# Patient Record
Sex: FEMALE | Race: BLACK OR AFRICAN AMERICAN | URBAN - METROPOLITAN AREA
[De-identification: names, ages, dates, MRNs, and addresses within clinical notes are randomized per-mention and may not be internally consistent; named-entity substitution may affect disease eponyms.]

---

## 2019-02-23 ENCOUNTER — APPOINTMENT (OUTPATIENT)
Dept: CT IMAGING | Age: 59
End: 2019-02-23
Payer: MEDICAID

## 2019-02-23 ENCOUNTER — HOSPITAL ENCOUNTER (EMERGENCY)
Age: 59
Discharge: HOME | End: 2019-02-23
Attending: EMERGENCY MEDICINE
Payer: MEDICAID

## 2019-02-23 VITALS
DIASTOLIC BLOOD PRESSURE: 74 MMHG | WEIGHT: 194 LBS | HEART RATE: 64 BPM | TEMPERATURE: 97.5 F | OXYGEN SATURATION: 94 % | RESPIRATION RATE: 16 BRPM | SYSTOLIC BLOOD PRESSURE: 125 MMHG

## 2019-02-23 DIAGNOSIS — R51.9 NONINTRACTABLE HEADACHE, UNSPECIFIED CHRONICITY PATTERN, UNSPECIFIED HEADACHE TYPE: ICD-10-CM

## 2019-02-23 DIAGNOSIS — R10.84 GENERALIZED ABDOMINAL PAIN: Primary | ICD-10-CM

## 2019-02-23 LAB
A/G RATIO: 1.2 (ref 1.1–2.2)
ALBUMIN SERPL-MCNC: 4.3 G/DL (ref 3.4–5)
ALP BLD-CCNC: 97 U/L (ref 40–129)
ALT SERPL-CCNC: 16 U/L (ref 10–40)
ANION GAP SERPL CALCULATED.3IONS-SCNC: 15 MMOL/L (ref 3–16)
AST SERPL-CCNC: 19 U/L (ref 15–37)
BASOPHILS ABSOLUTE: 0.1 K/UL (ref 0–0.2)
BASOPHILS RELATIVE PERCENT: 0.7 %
BILIRUB SERPL-MCNC: <0.2 MG/DL (ref 0–1)
BILIRUBIN URINE: NEGATIVE
BLOOD, URINE: NEGATIVE
BUN BLDV-MCNC: 16 MG/DL (ref 7–20)
CALCIUM SERPL-MCNC: 9.7 MG/DL (ref 8.3–10.6)
CHLORIDE BLD-SCNC: 100 MMOL/L (ref 99–110)
CLARITY: CLEAR
CO2: 25 MMOL/L (ref 21–32)
COLOR: YELLOW
CREAT SERPL-MCNC: 0.7 MG/DL (ref 0.6–1.1)
EOSINOPHILS ABSOLUTE: 0.1 K/UL (ref 0–0.6)
EOSINOPHILS RELATIVE PERCENT: 1.5 %
GFR AFRICAN AMERICAN: >60
GFR NON-AFRICAN AMERICAN: >60
GLOBULIN: 3.5 G/DL
GLUCOSE BLD-MCNC: 98 MG/DL (ref 70–99)
GLUCOSE URINE: NEGATIVE MG/DL
HCT VFR BLD CALC: 39 % (ref 36–48)
HEMOGLOBIN: 12.9 G/DL (ref 12–16)
KETONES, URINE: NEGATIVE MG/DL
LEUKOCYTE ESTERASE, URINE: NEGATIVE
LIPASE: 42 U/L (ref 13–60)
LYMPHOCYTES ABSOLUTE: 3.7 K/UL (ref 1–5.1)
LYMPHOCYTES RELATIVE PERCENT: 52.8 %
MCH RBC QN AUTO: 30.9 PG (ref 26–34)
MCHC RBC AUTO-ENTMCNC: 33.1 G/DL (ref 31–36)
MCV RBC AUTO: 93.6 FL (ref 80–100)
MICROSCOPIC EXAMINATION: NORMAL
MONOCYTES ABSOLUTE: 0.7 K/UL (ref 0–1.3)
MONOCYTES RELATIVE PERCENT: 9.8 %
NEUTROPHILS ABSOLUTE: 2.5 K/UL (ref 1.7–7.7)
NEUTROPHILS RELATIVE PERCENT: 35.2 %
NITRITE, URINE: NEGATIVE
PDW BLD-RTO: 13.3 % (ref 12.4–15.4)
PH UA: 6
PLATELET # BLD: 349 K/UL (ref 135–450)
PMV BLD AUTO: 7.8 FL (ref 5–10.5)
POTASSIUM REFLEX MAGNESIUM: 3.6 MMOL/L (ref 3.5–5.1)
PROTEIN UA: NEGATIVE MG/DL
RBC # BLD: 4.17 M/UL (ref 4–5.2)
SODIUM BLD-SCNC: 140 MMOL/L (ref 136–145)
SPECIFIC GRAVITY UA: 1.01
TOTAL PROTEIN: 7.8 G/DL (ref 6.4–8.2)
URINE REFLEX TO CULTURE: NORMAL
URINE TYPE: NORMAL
UROBILINOGEN, URINE: 0.2 E.U./DL
WBC # BLD: 7.1 K/UL (ref 4–11)

## 2019-02-23 PROCEDURE — 96361 HYDRATE IV INFUSION ADD-ON: CPT

## 2019-02-23 PROCEDURE — 80053 COMPREHEN METABOLIC PANEL: CPT

## 2019-02-23 PROCEDURE — 83690 ASSAY OF LIPASE: CPT

## 2019-02-23 PROCEDURE — 96375 TX/PRO/DX INJ NEW DRUG ADDON: CPT

## 2019-02-23 PROCEDURE — 6360000004 HC RX CONTRAST MEDICATION: Performed by: EMERGENCY MEDICINE

## 2019-02-23 PROCEDURE — 96374 THER/PROPH/DIAG INJ IV PUSH: CPT

## 2019-02-23 PROCEDURE — 70450 CT HEAD/BRAIN W/O DYE: CPT

## 2019-02-23 PROCEDURE — 6360000002 HC RX W HCPCS: Performed by: EMERGENCY MEDICINE

## 2019-02-23 PROCEDURE — 99284 EMERGENCY DEPT VISIT MOD MDM: CPT

## 2019-02-23 PROCEDURE — 74177 CT ABD & PELVIS W/CONTRAST: CPT

## 2019-02-23 PROCEDURE — 85025 COMPLETE CBC W/AUTO DIFF WBC: CPT

## 2019-02-23 PROCEDURE — 81003 URINALYSIS AUTO W/O SCOPE: CPT

## 2019-02-23 PROCEDURE — 2580000003 HC RX 258: Performed by: EMERGENCY MEDICINE

## 2019-02-23 RX ORDER — DICYCLOMINE HYDROCHLORIDE 10 MG/1
10 CAPSULE ORAL
Qty: 30 CAPSULE | Refills: 0 | Status: SHIPPED | OUTPATIENT
Start: 2019-02-23 | End: 2019-08-28 | Stop reason: CLARIF

## 2019-02-23 RX ORDER — IBUPROFEN 600 MG/1
600 TABLET ORAL EVERY 6 HOURS PRN
Qty: 30 TABLET | Refills: 0 | Status: SHIPPED | OUTPATIENT
Start: 2019-02-23 | End: 2019-08-28 | Stop reason: CLARIF

## 2019-02-23 RX ORDER — KETOROLAC TROMETHAMINE 30 MG/ML
30 INJECTION, SOLUTION INTRAMUSCULAR; INTRAVENOUS ONCE
Status: COMPLETED | OUTPATIENT
Start: 2019-02-23 | End: 2019-02-23

## 2019-02-23 RX ORDER — ONDANSETRON 4 MG/1
4-8 TABLET, ORALLY DISINTEGRATING ORAL EVERY 12 HOURS PRN
Qty: 12 TABLET | Refills: 0 | Status: SHIPPED | OUTPATIENT
Start: 2019-02-23 | End: 2019-08-28 | Stop reason: CLARIF

## 2019-02-23 RX ORDER — DIPHENHYDRAMINE HYDROCHLORIDE 50 MG/ML
25 INJECTION INTRAMUSCULAR; INTRAVENOUS ONCE
Status: COMPLETED | OUTPATIENT
Start: 2019-02-23 | End: 2019-02-23

## 2019-02-23 RX ORDER — 0.9 % SODIUM CHLORIDE 0.9 %
1000 INTRAVENOUS SOLUTION INTRAVENOUS ONCE
Status: COMPLETED | OUTPATIENT
Start: 2019-02-23 | End: 2019-02-23

## 2019-02-23 RX ADMIN — DIPHENHYDRAMINE HYDROCHLORIDE 25 MG: 50 INJECTION INTRAMUSCULAR; INTRAVENOUS at 20:10

## 2019-02-23 RX ADMIN — SODIUM CHLORIDE 1000 ML: 9 INJECTION, SOLUTION INTRAVENOUS at 20:10

## 2019-02-23 RX ADMIN — KETOROLAC TROMETHAMINE 30 MG: 30 INJECTION, SOLUTION INTRAMUSCULAR at 20:10

## 2019-02-23 RX ADMIN — PROCHLORPERAZINE EDISYLATE 10 MG: 5 INJECTION INTRAMUSCULAR; INTRAVENOUS at 20:10

## 2019-02-23 RX ADMIN — IOPAMIDOL 75 ML: 755 INJECTION, SOLUTION INTRAVENOUS at 20:52

## 2019-02-23 ASSESSMENT — PAIN DESCRIPTION - PAIN TYPE
TYPE: ACUTE PAIN
TYPE: ACUTE PAIN

## 2019-02-23 ASSESSMENT — PAIN SCALES - GENERAL
PAINLEVEL_OUTOF10: 4
PAINLEVEL_OUTOF10: 8
PAINLEVEL_OUTOF10: 8

## 2019-02-23 ASSESSMENT — PAIN DESCRIPTION - LOCATION
LOCATION: HEAD
LOCATION: HEAD

## 2019-02-23 ASSESSMENT — PAIN DESCRIPTION - FREQUENCY: FREQUENCY: CONTINUOUS

## 2019-02-23 ASSESSMENT — PAIN DESCRIPTION - PROGRESSION: CLINICAL_PROGRESSION: GRADUALLY IMPROVING

## 2019-02-24 ASSESSMENT — ENCOUNTER SYMPTOMS
BACK PAIN: 0
SHORTNESS OF BREATH: 0
VOMITING: 0
DIARRHEA: 0
NAUSEA: 1
ABDOMINAL PAIN: 1
BLOOD IN STOOL: 0
PHOTOPHOBIA: 0

## 2019-08-28 ENCOUNTER — TELEPHONE (OUTPATIENT)
Dept: FAMILY MEDICINE CLINIC | Age: 59
End: 2019-08-28

## 2019-09-12 ENCOUNTER — OFFICE VISIT (OUTPATIENT)
Dept: FAMILY MEDICINE CLINIC | Age: 59
End: 2019-09-12

## 2019-09-12 VITALS
OXYGEN SATURATION: 98 % | RESPIRATION RATE: 16 BRPM | DIASTOLIC BLOOD PRESSURE: 82 MMHG | TEMPERATURE: 98.6 F | SYSTOLIC BLOOD PRESSURE: 116 MMHG | WEIGHT: 200.4 LBS | HEART RATE: 80 BPM | HEIGHT: 67 IN | BODY MASS INDEX: 31.45 KG/M2

## 2019-09-12 DIAGNOSIS — I10 ESSENTIAL HYPERTENSION: ICD-10-CM

## 2019-09-12 DIAGNOSIS — Z12.11 COLON CANCER SCREENING: ICD-10-CM

## 2019-09-12 DIAGNOSIS — Z12.39 BREAST CANCER SCREENING: ICD-10-CM

## 2019-09-12 DIAGNOSIS — Z12.4 CERVICAL CANCER SCREENING: ICD-10-CM

## 2019-09-12 DIAGNOSIS — M25.512 ACUTE PAIN OF LEFT SHOULDER: Primary | ICD-10-CM

## 2019-09-12 DIAGNOSIS — Z13.220 LIPID SCREENING: ICD-10-CM

## 2019-09-12 PROCEDURE — 99204 OFFICE O/P NEW MOD 45 MIN: CPT | Performed by: FAMILY MEDICINE

## 2019-09-12 RX ORDER — AMLODIPINE BESYLATE 5 MG/1
5 TABLET ORAL DAILY
Qty: 30 TABLET | Refills: 2 | Status: SHIPPED | OUTPATIENT
Start: 2019-09-12 | End: 2022-02-09

## 2019-09-12 RX ORDER — NAPROXEN 500 MG/1
500 TABLET ORAL 2 TIMES DAILY WITH MEALS
Qty: 40 TABLET | Refills: 0 | Status: SHIPPED | OUTPATIENT
Start: 2019-09-12 | End: 2022-02-09

## 2019-09-12 RX ORDER — HYDROCHLOROTHIAZIDE 25 MG/1
25 TABLET ORAL DAILY
Qty: 30 TABLET | Refills: 2 | Status: SHIPPED | OUTPATIENT
Start: 2019-09-12 | End: 2022-02-09

## 2019-09-12 ASSESSMENT — ENCOUNTER SYMPTOMS
EYE PAIN: 0
CONSTIPATION: 0
DIARRHEA: 0
RECTAL PAIN: 0
COLOR CHANGE: 0
EYE ITCHING: 0
ANAL BLEEDING: 0
SORE THROAT: 0
BLOOD IN STOOL: 0
CHOKING: 0
NAUSEA: 0
ABDOMINAL DISTENTION: 0
SINUS PRESSURE: 0
EYE REDNESS: 0
BACK PAIN: 0
PHOTOPHOBIA: 0
STRIDOR: 0
APNEA: 0
SHORTNESS OF BREATH: 0
EYE DISCHARGE: 0
COUGH: 0
RHINORRHEA: 0
VOMITING: 0
TROUBLE SWALLOWING: 0
WHEEZING: 0
ABDOMINAL PAIN: 0
SINUS PAIN: 0
VOICE CHANGE: 0
CHEST TIGHTNESS: 0
FACIAL SWELLING: 0

## 2019-09-12 ASSESSMENT — PATIENT HEALTH QUESTIONNAIRE - PHQ9
2. FEELING DOWN, DEPRESSED OR HOPELESS: 0
SUM OF ALL RESPONSES TO PHQ QUESTIONS 1-9: 0
1. LITTLE INTEREST OR PLEASURE IN DOING THINGS: 0
SUM OF ALL RESPONSES TO PHQ9 QUESTIONS 1 & 2: 0
SUM OF ALL RESPONSES TO PHQ QUESTIONS 1-9: 0

## 2019-09-12 NOTE — PROGRESS NOTES
reviewed. Pt' wishes to continue to proceed w/meds. hydrochlorothiazide (HYDRODIURIL) 25 MG tablet    amLODIPine (NORVASC) 5 MG tablet   3. Lipid screening  Comprehensive Metabolic Panel    Lipid Panel   4. Breast cancer screening  SONIDO DIGITAL SCREEN W OR WO CAD BILATERAL   5. Colon cancer screening  Ambulatory referral to Gastroenterology   6. Cervical cancer screening  Pt' to establish with gyn for routine screening exams. Plan:      Advised release of medical records from all prior physicians(including PCP/specialists)  Call or return to clinic prn if these symptoms worsen or fail to improve as anticipated. Pt' left office in good condition. Obtain labs/diagnostic tests as discussed today & call back for results within 2-7days.           Irvin Morrow MD

## 2021-01-19 ENCOUNTER — TELEPHONE (OUTPATIENT)
Dept: FAMILY MEDICINE CLINIC | Age: 61
End: 2021-01-19

## 2021-01-19 NOTE — TELEPHONE ENCOUNTER
Patient would like to schedule an appt for pain in leg and back. OV: 9/12/19  [phone]    Please advise.

## 2021-06-04 ENCOUNTER — HOSPITAL ENCOUNTER (EMERGENCY)
Age: 61
Discharge: HOME | End: 2021-06-04
Payer: COMMERCIAL

## 2021-06-04 ENCOUNTER — APPOINTMENT (OUTPATIENT)
Dept: GENERAL RADIOLOGY | Age: 61
End: 2021-06-04
Payer: COMMERCIAL

## 2021-06-04 VITALS
HEART RATE: 67 BPM | SYSTOLIC BLOOD PRESSURE: 145 MMHG | RESPIRATION RATE: 18 BRPM | BODY MASS INDEX: 30.85 KG/M2 | OXYGEN SATURATION: 96 % | DIASTOLIC BLOOD PRESSURE: 86 MMHG | WEIGHT: 197 LBS | TEMPERATURE: 98.2 F

## 2021-06-04 DIAGNOSIS — M54.50 ACUTE LEFT-SIDED LOW BACK PAIN WITHOUT SCIATICA: Primary | ICD-10-CM

## 2021-06-04 PROCEDURE — 99283 EMERGENCY DEPT VISIT LOW MDM: CPT

## 2021-06-04 PROCEDURE — 6370000000 HC RX 637 (ALT 250 FOR IP): Performed by: PHYSICIAN ASSISTANT

## 2021-06-04 PROCEDURE — 72100 X-RAY EXAM L-S SPINE 2/3 VWS: CPT

## 2021-06-04 PROCEDURE — 6360000002 HC RX W HCPCS: Performed by: PHYSICIAN ASSISTANT

## 2021-06-04 PROCEDURE — 96372 THER/PROPH/DIAG INJ SC/IM: CPT

## 2021-06-04 RX ORDER — LIDOCAINE 4 G/G
1 PATCH TOPICAL ONCE
Status: DISCONTINUED | OUTPATIENT
Start: 2021-06-04 | End: 2021-06-04 | Stop reason: HOSPADM

## 2021-06-04 RX ORDER — NAPROXEN 500 MG/1
500 TABLET ORAL 2 TIMES DAILY
Qty: 20 TABLET | Refills: 0 | Status: SHIPPED | OUTPATIENT
Start: 2021-06-04 | End: 2022-02-09

## 2021-06-04 RX ORDER — CYCLOBENZAPRINE HCL 10 MG
10 TABLET ORAL 3 TIMES DAILY PRN
Qty: 20 TABLET | Refills: 0 | Status: SHIPPED | OUTPATIENT
Start: 2021-06-04 | End: 2021-06-14

## 2021-06-04 RX ORDER — ORPHENADRINE CITRATE 30 MG/ML
60 INJECTION INTRAMUSCULAR; INTRAVENOUS ONCE
Status: COMPLETED | OUTPATIENT
Start: 2021-06-04 | End: 2021-06-04

## 2021-06-04 RX ORDER — KETOROLAC TROMETHAMINE 30 MG/ML
30 INJECTION, SOLUTION INTRAMUSCULAR; INTRAVENOUS ONCE
Status: COMPLETED | OUTPATIENT
Start: 2021-06-04 | End: 2021-06-04

## 2021-06-04 RX ORDER — LIDOCAINE 50 MG/G
1 PATCH TOPICAL DAILY
Qty: 30 PATCH | Refills: 0 | Status: SHIPPED | OUTPATIENT
Start: 2021-06-04 | End: 2022-02-09

## 2021-06-04 RX ADMIN — KETOROLAC TROMETHAMINE 30 MG: 30 INJECTION, SOLUTION INTRAMUSCULAR at 10:33

## 2021-06-04 RX ADMIN — ORPHENADRINE CITRATE 60 MG: 60 INJECTION INTRAMUSCULAR; INTRAVENOUS at 10:33

## 2021-06-04 ASSESSMENT — PAIN SCALES - GENERAL
PAINLEVEL_OUTOF10: 9
PAINLEVEL_OUTOF10: 9

## 2021-06-04 NOTE — ED PROVIDER NOTES
[facility]        Pt Name: Erik Severino  MRN: [id number]  [place] 1960  Date of evaluation: 6/4/2021  Provider: Gregg Painting PA-C  PCP: Laura Kowalski MD  Note Started: 10:40 AM EDT       OLIVIA. I have evaluated this patient. My supervising physician was available for consultation. CHIEF COMPLAINT       Chief Complaint   Patient presents with    Back Pain     Left sided lower back pain for three months after fall sustained in Feb.         HISTORY OF PRESENT ILLNESS   (Location, Timing/Onset, Context/Setting, Quality, Duration, Modifying Factors, Severity, Associated Signs and Symptoms)  Note limiting factors. Erik Severino is a 64 y.o. female who presents with a Chief Complaint of left low back pain. Language line [id number] was used to obtain history. Patient states that she fell back in February when she slipped on ice on the steps and fell backwards. She states she however did not have pain constantly until about 1 week ago. She was initially seen in urgent care on 5/22 and was given a Medrol Dosepak and ibuprofen and states that the pain got worse last night. 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 She denies nausea, vomiting, fevers, dysuria, hematuria, diarrhea, bloody stool or any urinary or bowel symptoms. Denies a history of kidney failure or diabetes. States that she was referred here by her primary care physician due to Covid. Denies any other symptoms. Rates the pain a 9 out of 10. This is worse with movement. Nursing Notes were all reviewed and agreed with or any disagreements were addressed in the HPI. REVIEW OF SYSTEMS    (2-9 systems for level 4, 10 or more for level 5)     Review of Systems    Positives and Pertinent negatives as per HPI. Except as noted above in the ROS, all other systems were reviewed and negative.        PAST MEDICAL HISTORY     Past Medical History:   Diagnosis Date    Cervical cancer screening 2016    Nml per pt'.  History of mammogram 07/2018    Nml per pt'.  HTN (hypertension)          SURGICAL HISTORY   History reviewed. No pertinent surgical history. [address]       Discharge Medication List as of 6/4/2021 11:13 AM      CONTINUE these medications which have NOT CHANGED    Details   hydrochlorothiazide (HYDRODIURIL) 25 MG tablet Take 1 tablet by mouth daily, Disp-30 tablet, R-2Normal      amLODIPine (NORVASC) 5 MG tablet Take 1 tablet by mouth daily, Disp-30 tablet, R-2Normal      !! naproxen (NAPROSYN) 500 MG tablet Take 1 tablet by mouth 2 times daily (with meals), Disp-40 tablet, R-0Normal       !! - Potential duplicate medications found. Please discuss with provider. ALLERGIES     Patient has no known allergies. FAMILYHISTORY     History reviewed. No pertinent family history. SOCIAL HISTORY       Social History     Tobacco Use    Smoking status: Never Smoker    Smokeless tobacco: Never Used   Vaping Use    Vaping Use: Unknown   Substance Use Topics    Alcohol use: No    Drug use: Never       SCREENINGS             PHYSICAL EXAM    (up to 7 for level 4, 8 or more for level 5)     ED Triage Vitals [06/04/21 1002]   BP Temp Temp Source Pulse Resp SpO2 Height Weight   (!) 145/86 98.2 °F (36.8 °C) Oral 67 18 96 % -- 197 lb (89.4 kg)       Physical Exam  Vitals and nursing note reviewed. Constitutional:       Appearance: She is well-developed. She is not diaphoretic. HENT:      Head: Atraumatic. Nose: Nose normal.   Eyes:      General:         Right eye: No discharge. Left eye: No discharge. Cardiovascular:      Rate and Rhythm: Normal rate and regular rhythm. Heart sounds: No murmur heard. No friction rub. No gallop. Pulmonary:      Effort: Pulmonary effort is normal. No respiratory distress. Breath sounds: No stridor.  No wheezing, rhonchi or rales.   Abdominal:      General: Bowel sounds are normal. There is no distension. Palpations: Abdomen is soft. There is no mass. Tenderness: There is no abdominal tenderness. There is no guarding or rebound. Hernia: No hernia is present. Musculoskeletal:         General: No swelling. Normal range of motion. Cervical back: Normal range of motion. Comments: Tenderness to palpate over the left paralumbar musculature. No midline tenderness or step-off in the neck or back. Mild decreased range of motion with flexion of left hip secondary to pain. Patient able to ambulate. Full range of motion of right hip with flexion extension of bilateral knees, ankles. Sensation light touch in bilateral lower extremities intact. 2+ patellar and Achilles tendon reflexes intact. Skin:     General: Skin is warm and dry. Findings: No erythema or rash. Neurological:      Mental Status: She is alert and oriented to person, place, and time. Cranial Nerves: No cranial nerve deficit. Psychiatric:         Behavior: Behavior normal.         DIAGNOSTIC RESULTS   LABS:    Labs Reviewed - No data to display    All other labs were within normal range or not returned as of this dictation. EKG: All EKG's are interpreted by the Emergency Department Physician in the absence of a cardiologist.  Please see their note for interpretation of EKG. RADIOLOGY:   Non-plain film images such as CT, Ultrasound and MRI are read by the radiologist. Plain radiographic images are visualized and preliminarily interpreted by the ED Provider with the below findings:        Interpretation per the Radiologist below, if available at the time of this note:    XR LUMBAR SPINE (2-3 VIEWS)   Final Result   Mild multilevel degenerative changes. No acute osseous abnormality.       Rounded calcification to the right of midline is of undetermined   location/etiology and is likely of no clinical significance in the absence of   signs patch, R-0Print      cyclobenzaprine (FLEXERIL) 10 MG tablet Take 1 tablet by mouth 3 times daily as needed for Muscle spasms, Disp-20 tablet, R-0Print       !! - Potential duplicate medications found. Please discuss with provider.           DISCONTINUED MEDICATIONS:  Discharge Medication List as of 6/4/2021 11:13 AM                 (Please note that portions of this note were completed with a voice recognition program.  Efforts were made to edit the dictations but occasionally words are mis-transcribed.)    Melvia Baumgarten, PA-C (electronically signed)            Melvia Baumgarten, PA-C  06/04/21 0491

## 2021-06-04 NOTE — ED NOTES
Using  #[id number]- d/c instructions given. Pt ambulated independently.       Yan Soni RN  06/04/21 3981

## 2021-06-10 ENCOUNTER — OFFICE VISIT (OUTPATIENT)
Dept: INTERNAL MEDICINE CLINIC | Age: 61
End: 2021-06-10
Payer: COMMERCIAL

## 2021-06-10 VITALS
SYSTOLIC BLOOD PRESSURE: 118 MMHG | BODY MASS INDEX: 30.61 KG/M2 | HEIGHT: 67 IN | HEART RATE: 86 BPM | DIASTOLIC BLOOD PRESSURE: 66 MMHG | WEIGHT: 195 LBS | OXYGEN SATURATION: 97 %

## 2021-06-10 DIAGNOSIS — M54.32 SCIATICA OF LEFT SIDE: Primary | ICD-10-CM

## 2021-06-10 PROCEDURE — 1111F DSCHRG MED/CURRENT MED MERGE: CPT | Performed by: NURSE PRACTITIONER

## 2021-06-10 PROCEDURE — 99204 OFFICE O/P NEW MOD 45 MIN: CPT | Performed by: NURSE PRACTITIONER

## 2021-06-10 RX ORDER — PREDNISONE 20 MG/1
40 TABLET ORAL DAILY
Qty: 20 TABLET | Refills: 0 | Status: SHIPPED | OUTPATIENT
Start: 2021-06-10 | End: 2021-06-20

## 2021-06-10 SDOH — ECONOMIC STABILITY: FOOD INSECURITY: WITHIN THE PAST 12 MONTHS, YOU WORRIED THAT YOUR FOOD WOULD RUN OUT BEFORE YOU GOT MONEY TO BUY MORE.: NEVER TRUE

## 2021-06-10 SDOH — ECONOMIC STABILITY: FOOD INSECURITY: WITHIN THE PAST 12 MONTHS, THE FOOD YOU BOUGHT JUST DIDN'T LAST AND YOU DIDN'T HAVE MONEY TO GET MORE.: NEVER TRUE

## 2021-06-10 ASSESSMENT — PATIENT HEALTH QUESTIONNAIRE - PHQ9
SUM OF ALL RESPONSES TO PHQ QUESTIONS 1-9: 0
1. LITTLE INTEREST OR PLEASURE IN DOING THINGS: 0
SUM OF ALL RESPONSES TO PHQ QUESTIONS 1-9: 0
2. FEELING DOWN, DEPRESSED OR HOPELESS: 0
SUM OF ALL RESPONSES TO PHQ QUESTIONS 1-9: 0
SUM OF ALL RESPONSES TO PHQ9 QUESTIONS 1 & 2: 0

## 2021-06-10 ASSESSMENT — SOCIAL DETERMINANTS OF HEALTH (SDOH): HOW HARD IS IT FOR YOU TO PAY FOR THE VERY BASICS LIKE FOOD, HOUSING, MEDICAL CARE, AND HEATING?: NOT HARD AT ALL

## 2021-06-10 NOTE — PROGRESS NOTES
tablet Take 1 tablet by mouth 2 times daily for 20 doses 20 tablet 0    lidocaine (LIDODERM) 5 % Place 1 patch onto the skin daily 12 hours on, 12 hours off. 30 patch 0    cyclobenzaprine (FLEXERIL) 10 MG tablet Take 1 tablet by mouth 3 times daily as needed for Muscle spasms 20 tablet 0    hydrochlorothiazide (HYDRODIURIL) 25 MG tablet Take 1 tablet by mouth daily 30 tablet 2    amLODIPine (NORVASC) 5 MG tablet Take 1 tablet by mouth daily 30 tablet 2    naproxen (NAPROSYN) 500 MG tablet Take 1 tablet by mouth 2 times daily (with meals) 40 tablet 0        Medications patient taking as of now reconciled against medications ordered at time of hospital discharge: Yes    Chief Complaint   Patient presents with    Follow-Up from Hospital       History of Present illness - Follow up of Hospital diagnosis(es): back pain    Inpatient course: Discharge summary reviewed- see chart. Interval history/Current status: Patient in the office today to follow-up from hospital visit to the emergency room. She hurt her back back in February when she fell and has a recurrence of pain. She states that the pain is in the lower left side of her back radiating to her left buttock and down her leg. She was given a muscle relaxer, naproxen, and Lidoderm patches. She states that the pain is not improved with these medications. She states that the pain is worse when in a position but when she gets up and starts moving the pain is improved. She has been trying stretches and exercises as recommended by the emergency room. She has no new or worsening symptoms otherwise. A comprehensive review of systems was negative except for what was noted in the HPI. Vitals:    06/10/21 1100   BP: 118/66   Site: Left Upper Arm   Position: Sitting   Cuff Size: Large Adult   Pulse: 86   SpO2: 97%   Weight: 195 lb (88.5 kg)   Height: 5' 7\" (1.702 m)     Body mass index is 30.54 kg/m².    Wt Readings from Last 3 Encounters:   06/10/21 195 lb (88.5 kg)   06/04/21 197 lb (89.4 kg)   09/12/19 200 lb 6.4 oz (90.9 kg)     BP Readings from Last 3 Encounters:   06/10/21 118/66   06/04/21 (!) 145/86   09/12/19 116/82        Physical Exam:  General Appearance: alert and oriented to person, place and time, well developed and well- nourished, in no acute distress  Skin: warm and dry, no rash or erythema  Head: normocephalic and atraumatic  Eyes: pupils equal, round, and reactive to light, extraocular eye movements intact, conjunctivae normal  ENT: tympanic membrane, external ear and ear canal normal bilaterally, nose without deformity, nasal mucosa and turbinates normal without polyps  Neck: supple and non-tender without mass, no thyromegaly or thyroid nodules, no cervical lymphadenopathy  Pulmonary/Chest: clear to auscultation bilaterally- no wheezes, rales or rhonchi, normal air movement, no respiratory distress  Cardiovascular: normal rate, regular rhythm, normal S1 and S2, no murmurs, rubs, clicks, or gallops, distal pulses intact, no carotid bruits  Abdomen: soft, non-tender, non-distended, normal bowel sounds, no masses or organomegaly  Extremities: no cyanosis, clubbing or edema  Musculoskeletal: normal range of motion, no joint swelling, deformity or tenderness  Neurologic: reflexes normal and symmetric, no cranial nerve deficit, gait, coordination and speech normal    Assessment/Plan:      Sciatica of left side   ER visit reviewed. Symptoms today still consistent with sciatic back pain. We will send in prescription of prednisone and reiterate stretches and exercises to be completed. Advised that this is a recurring issue may flare from time to time. Back strengthening exercises and proper ergonomics can prevent future flareups. She is to call office if symptoms not improve as expected or worsen.     Medical Decision Making: high complexity

## 2021-06-10 NOTE — ASSESSMENT & PLAN NOTE
ER visit reviewed. Symptoms today still consistent with sciatic back pain. We will send in prescription of prednisone and reiterate stretches and exercises to be completed. Advised that this is a recurring issue may flare from time to time. Back strengthening exercises and proper ergonomics can prevent future flareups. She is to call office if symptoms not improve as expected or worsen.

## 2021-06-10 NOTE — PATIENT INSTRUCTIONS
new or worse symptoms in your legs or buttocks. Symptoms may include:  ? Numbness or tingling. ? Weakness. ? Pain.     · You lose bladder or bowel control. Watch closely for changes in your health, and be sure to contact your doctor if:    · You are not getting better as expected. Where can you learn more? Go to https://chpeav.[employer]. org and sign in to your [employer] account. Enter [phone] in the [facility] box to learn more about \"Sciatica: Care Instructions. \"     If you do not have an account, please click on the \"Sign Up Now\" link. Current as of: November 16, 2020               Content Version: 12.8  © 2006-2021 [employer]. Care instructions adapted under license by [facility] ([facility]). If you have questions about a medical condition or this instruction, always ask your healthcare professional. [address] any warranty or liability for your use of this information. Patient Education        Sciatica: Exercises  Introduction  Here are some examples of typical rehabilitation exercises for your condition. Start each exercise slowly. Ease off the exercise if you start to have pain. Your doctor or physical therapist will tell you when you can start these exercises and which ones will work best for you. When you are not being active, find a comfortable position for rest. Some people are comfortable on the floor or a medium-firm bed with a small pillow under their head and another under their knees. Some people prefer to lie on their side with a pillow between their knees. Don't stay in one position for too long. Take short walks (10 to 20 minutes) every 2 to 3 hours. Avoid slopes, hills, and stairs until you feel better. Walk only distances you can manage without pain, especially leg pain. How to do the exercises  Back stretches   1. Get down on your hands and knees on the floor. 2. Relax your head and allow it to droop.  Round your back up toward the ceiling until you feel a nice stretch in your upper, middle, and lower back. Hold this stretch for as long as it feels comfortable, or about 15 to 30 seconds. 3. Return to the starting position with a flat back while you are on your hands and knees. 4. Let your back sway by pressing your stomach toward the floor. Lift your buttocks toward the ceiling. 5. Hold this position for 15 to 30 seconds. 6. Repeat 2 to 4 times. Follow-up care is a key part of your treatment and safety. Be sure to make and go to all appointments, and call your doctor if you are having problems. It's also a good idea to know your test results and keep a list of the medicines you take. Where can you learn more? Go to https://[employer]. org and sign in to your [employer] account. Enter [address]149 in the [employer] box to learn more about \"Sciatica: Exercises. \"     If you do not have an account, please click on the \"Sign Up Now\" link. Current as of: November 16, 2020               Content Version: 12.8  © 7213-1984 [employer]. Care instructions adapted under license by [address] If you have questions about a medical condition or this instruction, always ask your healthcare professional. [address] any warranty or liability for your use of this information.

## 2022-01-17 ENCOUNTER — TELEPHONE (OUTPATIENT)
Dept: FAMILY MEDICINE CLINIC | Age: 62
End: 2022-01-17

## 2022-01-17 NOTE — TELEPHONE ENCOUNTER
----- Message from Lynda Augustine sent at 2022  4:41 PM EST -----  Subject: Appointment Request    Reason for Call: New Patient Request Appointment    QUESTIONS  Type of Appointment? New Patient/New to Provider  Reason for appointment request? Requested Provider unavailable -   Miguel Gilbert  Additional Information for Provider? Patient would like to establish care   with this PCP as well as . Patient deals with pain in her leg and   is experiencing cough symptoms as well. Can leave text message on phone   number. ---------------------------------------------------------------------------  --------------  Deyanira Meebo CASSANDRA  What is the best way for the office to contact you? Do not leave any   message, patient will call back for answer  Preferred Call Back Phone Number? [phone]  ---------------------------------------------------------------------------  --------------  SCRIPT ANSWERS  Relationship to Patient? Other  Representative Name? Ceci Samuels  Additional information verified (besides Name and Date of Birth)? Address  Specialty Confirmation? Primary Care  Is this the first appointment to establish care for a ? No  Have you been diagnosed with, awaiting test results for, or told that you   are suspected of having COVID-19 (Coronavirus)? (If patient has tested   negative or was tested as a requirement for work, school, or travel and   not based on symptoms, answer no)? No  Within the past two weeks have you developed any of the following symptoms   (answer no if symptoms have been present longer than 2 weeks or began   more than 2 weeks ago)? Fever or Chills, Cough, Shortness of breath or   difficulty breathing, Loss of taste or smell, Sore throat, Nasal   congestion, Sneezing or runny nose, Fatigue or generalized body aches   (answer no if pain is specific to a body part e.g. back pain), Diarrhea,   Headache?  Yes

## 2022-01-17 NOTE — TELEPHONE ENCOUNTER
I called and left a message for patient to call us back to make appointments with DR. Ignacio Huynh. [facility] please encourage patient to make new pt with [facility].

## 2022-02-02 ENCOUNTER — TELEPHONE (OUTPATIENT)
Dept: FAMILY MEDICINE CLINIC | Age: 62
End: 2022-02-02

## 2022-02-02 NOTE — TELEPHONE ENCOUNTER
[facility]   Please ask patient to reschedule for some time next week because of the predicted weather for the next 2 days. Please reschedule Zoya Aniyahtes 1/2/1958 as well as they are together.

## 2022-02-09 ENCOUNTER — OFFICE VISIT (OUTPATIENT)
Dept: FAMILY MEDICINE CLINIC | Age: 62
End: 2022-02-09
Payer: COMMERCIAL

## 2022-02-09 VITALS
WEIGHT: 192.4 LBS | HEART RATE: 69 BPM | HEIGHT: 67 IN | SYSTOLIC BLOOD PRESSURE: 130 MMHG | BODY MASS INDEX: 30.2 KG/M2 | OXYGEN SATURATION: 96 % | DIASTOLIC BLOOD PRESSURE: 70 MMHG

## 2022-02-09 DIAGNOSIS — Z12.11 SCREENING FOR COLON CANCER: ICD-10-CM

## 2022-02-09 DIAGNOSIS — E66.09 CLASS 1 OBESITY DUE TO EXCESS CALORIES WITH SERIOUS COMORBIDITY AND BODY MASS INDEX (BMI) OF 30.0 TO 30.9 IN ADULT: ICD-10-CM

## 2022-02-09 DIAGNOSIS — Z00.00 ANNUAL PHYSICAL EXAM: Primary | ICD-10-CM

## 2022-02-09 DIAGNOSIS — I10 PRIMARY HYPERTENSION: ICD-10-CM

## 2022-02-09 DIAGNOSIS — Z11.59 NEED FOR HEPATITIS C SCREENING TEST: ICD-10-CM

## 2022-02-09 DIAGNOSIS — Z11.4 SCREENING FOR HIV (HUMAN IMMUNODEFICIENCY VIRUS): ICD-10-CM

## 2022-02-09 DIAGNOSIS — Z23 NEED FOR VACCINATION: ICD-10-CM

## 2022-02-09 DIAGNOSIS — M54.31 SCIATICA OF RIGHT SIDE: ICD-10-CM

## 2022-02-09 DIAGNOSIS — M72.2 PLANTAR FASCIITIS OF RIGHT FOOT: ICD-10-CM

## 2022-02-09 PROCEDURE — 99396 PREV VISIT EST AGE 40-64: CPT | Performed by: NURSE PRACTITIONER

## 2022-02-09 RX ORDER — CYCLOBENZAPRINE HCL 10 MG
10 TABLET ORAL 3 TIMES DAILY PRN
Qty: 30 TABLET | Refills: 0 | Status: SHIPPED | OUTPATIENT
Start: 2022-02-09 | End: 2022-02-15 | Stop reason: SDUPTHER

## 2022-02-09 RX ORDER — IBUPROFEN 800 MG/1
800 TABLET ORAL
Qty: 90 TABLET | Refills: 0 | Status: SHIPPED | OUTPATIENT
Start: 2022-02-09 | End: 2022-02-15 | Stop reason: SDUPTHER

## 2022-02-09 ASSESSMENT — ENCOUNTER SYMPTOMS
WHEEZING: 0
NAUSEA: 0
SINUS PRESSURE: 0
BLOOD IN STOOL: 0
ABDOMINAL PAIN: 0
VOMITING: 0
SORE THROAT: 0
CONSTIPATION: 0
SHORTNESS OF BREATH: 0
SINUS PAIN: 0
CHEST TIGHTNESS: 0
EYES NEGATIVE: 1
COUGH: 0
DIARRHEA: 0

## 2022-02-09 ASSESSMENT — PATIENT HEALTH QUESTIONNAIRE - PHQ9
6. FEELING BAD ABOUT YOURSELF - OR THAT YOU ARE A FAILURE OR HAVE LET YOURSELF OR YOUR FAMILY DOWN: 0
5. POOR APPETITE OR OVEREATING: 0
10. IF YOU CHECKED OFF ANY PROBLEMS, HOW DIFFICULT HAVE THESE PROBLEMS MADE IT FOR YOU TO DO YOUR WORK, TAKE CARE OF THINGS AT HOME, OR GET ALONG WITH OTHER PEOPLE: 0
8. MOVING OR SPEAKING SO SLOWLY THAT OTHER PEOPLE COULD HAVE NOTICED. OR THE OPPOSITE, BEING SO FIGETY OR RESTLESS THAT YOU HAVE BEEN MOVING AROUND A LOT MORE THAN USUAL: 0
2. FEELING DOWN, DEPRESSED OR HOPELESS: 0
SUM OF ALL RESPONSES TO PHQ QUESTIONS 1-9: 0
SUM OF ALL RESPONSES TO PHQ QUESTIONS 1-9: 0
7. TROUBLE CONCENTRATING ON THINGS, SUCH AS READING THE NEWSPAPER OR WATCHING TELEVISION: 0
3. TROUBLE FALLING OR STAYING ASLEEP: 0
4. FEELING TIRED OR HAVING LITTLE ENERGY: 0
9. THOUGHTS THAT YOU WOULD BE BETTER OFF DEAD, OR OF HURTING YOURSELF: 0
SUM OF ALL RESPONSES TO PHQ9 QUESTIONS 1 & 2: 0
1. LITTLE INTEREST OR PLEASURE IN DOING THINGS: 0
SUM OF ALL RESPONSES TO PHQ QUESTIONS 1-9: 0
SUM OF ALL RESPONSES TO PHQ QUESTIONS 1-9: 0

## 2022-02-09 NOTE — PROGRESS NOTES
2022     Enriqueta Smith (:  1960) is a 63 y.o. female, here for evaluation of the following medical concerns:    Chief Complaint   Patient presents with    Annual Exam     Hypertension:  Home blood pressure monitoring: No.  She is not adherent to a low sodium diet. Patient denies chest pain, shortness of breath, headache, lightheadedness, blurred vision, peripheral edema, palpitations, dry cough and fatigue. Antihypertensive medication side effects: no medication side effects noted. Use of agents associated with hypertension: none. Has not taken medication for HTN for 3 months. She is going to [employer] daily. Plantar fascitis:  Right foot pain for 6 months. Wearing very flat shoes most days with little to no support. Sciatica: does not recall any injury to back. Worse when standing and driving for long periods. 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 Sodium (mmol/L)   Date Value   2019 140    BUN (mg/dL)   Date Value   2019 16    Glucose (mg/dL)   Date Value   2019 98      Potassium reflex Magnesium (mmol/L)   Date Value   2019 3.6    CREATININE (mg/dL)   Date Value   2019 0.7         Review of Systems   Constitutional: Negative for chills, diaphoresis, fatigue and fever. HENT: Negative for congestion, ear discharge, ear pain, sinus pressure, sinus pain and sore throat. Eyes: Negative. Respiratory: Negative for cough, chest tightness, shortness of breath and wheezing. Cardiovascular: Negative for chest pain, palpitations and leg swelling. Gastrointestinal: Negative for abdominal pain, blood in stool, constipation, diarrhea, nausea and vomiting. Endocrine: Negative. Genitourinary: Negative. Musculoskeletal: Negative. Skin: Negative. Neurological: Negative for dizziness, weakness and headaches. Psychiatric/Behavioral: Negative.         Prior to Visit Medications    Medication Sig Taking? Authorizing Provider   cyclobenzaprine (FLEXERIL) 10 MG tablet Take 1 tablet by mouth 3 times daily as needed for Muscle spasms Yes SARAH Stout CNP   ibuprofen (ADVIL;MOTRIN) 800 MG tablet Take 1 tablet by mouth 3 times daily (with meals) Yes SARAH Stout CNP      Social History     Tobacco Use    Smoking status: Never Smoker    Smokeless tobacco: Never Used   Vaping Use    Vaping Use: Unknown   Substance Use Topics    Alcohol use: No    Drug use: Never      Vitals:    02/09/22 1031   BP: 130/70   Pulse: 69   SpO2: 96%   Weight: 192 lb 6.4 oz (87.3 kg)   Height: 5' 7\" (1.702 m)     Estimated body mass index is 30.13 kg/m² as calculated from the following:    Height as of this encounter: 5' 7\" (1.702 m). Weight as of this encounter: 192 lb 6.4 oz (87.3 kg). Physical Exam  Vitals and nursing note reviewed. Constitutional:       General: She is awake. She is not in acute distress. Appearance: Normal appearance. She is well-developed and well-groomed. She is obese. She is not ill-appearing. HENT:      Head: Normocephalic and atraumatic. Right Ear: Tympanic membrane, ear canal and external ear normal.      Left Ear: Tympanic membrane, ear canal and external ear normal.      Nose: Nose normal.      Mouth/Throat:      Lips: Pink. Mouth: Mucous membranes are moist.      Pharynx: Oropharynx is clear. Eyes:      Extraocular Movements: Extraocular movements intact. Conjunctiva/sclera: Conjunctivae normal.      Pupils: Pupils are equal, round, and reactive to light. Neck:      Thyroid: No thyroid mass, thyromegaly or thyroid tenderness. Vascular: No carotid bruit or JVD. Cardiovascular:      Rate and Rhythm: Normal rate and regular rhythm. Heart sounds: Normal heart sounds, S1 normal and S2 normal. No murmur heard.       Pulmonary:      Effort: Pulmonary effort is normal.      Breath sounds: Normal breath sounds and air entry. Chest:   Breasts:      Right: No supraclavicular adenopathy. Left: No supraclavicular adenopathy. Abdominal:      General: Abdomen is flat. Bowel sounds are normal.      Palpations: Abdomen is soft. Musculoskeletal:         General: Normal range of motion. Cervical back: Normal range of motion and neck supple. Right lower leg: No edema. Left lower leg: No edema. Feet:    Feet:      Comments: Pain on deep palpation of right heel  Lymphadenopathy:      Head:      Right side of head: No submental, submandibular, tonsillar, preauricular or posterior auricular adenopathy. Left side of head: No submental, submandibular, tonsillar, preauricular or posterior auricular adenopathy. Cervical: No cervical adenopathy. Upper Body:      Right upper body: No supraclavicular adenopathy. Left upper body: No supraclavicular adenopathy. Skin:     General: Skin is warm and dry. Capillary Refill: Capillary refill takes less than 2 seconds. Neurological:      General: No focal deficit present. Mental Status: She is alert and oriented to person, place, and time. GCS: GCS eye subscore is 4. GCS verbal subscore is 5. GCS motor subscore is 6. Psychiatric:         Attention and Perception: Attention normal.         Mood and Affect: Mood normal.         Speech: Speech normal.         Behavior: Behavior normal. Behavior is cooperative. Thought Content: Thought content normal.         Judgment: Judgment normal.     ASSESSMENT/PLAN:  1. Annual physical exam    2. Primary hypertension  Labs pending  Continue going to [employer], this is making a great improvement in your blood pressure.  - LIPID PANEL; Future  - COMPREHENSIVE METABOLIC PANEL; Future  - CBC WITH AUTO DIFFERENTIAL; Future  - Hemoglobin A1C; Future  - TSH with Reflex; Future  - T4, FREE; Future    3.  Class 1 obesity due to excess calories with serious comorbidity and body mass index

## 2022-02-09 NOTE — PATIENT INSTRUCTIONS
Rosita Poles, [facility]  [address]  [place], 727 [facility]  [phone]    [facility]  [address], 136 [facility], 727 [facility]  100 [facility]  [address] [place] [address], 25[address] [facility]  75 [facility]  (multiple locations)  [phone]    Patient Education        Sciatique : recommandations de soin  Sciatica: Care Instructions  Marisela recommandations de soin     La sciatique est l'inflammation d'un jaqueline nerfs sciatiques, venant de la moelle épinière dans le bas [place], [place] [employer]. Les nerfs sciatiques et leurs branches s'étendent jaqueline fessiers jusqu'aux pieds. La sciatique s'installe lorsqu'un disque abîmé [employer] appuie contre une cheko nerveuse de la moelle épinière. Les symptômes principaux sont la douleur, l'engourdissement ou la faiblesse thanh est souvent plus importante dans la jambe ou dans le pied que [employer]. Souvent, la sciatique s'améliore et disparaît avec le temps. Un traitement précoce comprend CHS Inc et jaqueline exercices pour soulager la douleur. Les soins de suivi sont essentiels pour votre bronwyn-être et Toll Brothers. Veuillez vous rendre à tous les rendez-vous et appelez votre médecin si vous Charles Schwab. Il est également judicieux de connaître tous marisela résultats d'examens et de Terex Corporation jaqueline médicaments que vous prenez. Comment prendre soin de vous à la perla ? · Prenez jaqueline [employer]W Automotive exactement tels qu'ils vous ont été prescrits. ? Si le médecin a établi une ordonnance de [employer]W [employer]otive, prenez-les tels que prescrit. ? Si vous ne prenez pas de médicaments antidouleur guerita ordonnance, demandez à votre médecin si vous pouvez prendre un médicament sans ordonnance. · Utilisez de la chaleur ou du froid pour soulager la douleur. ?  Pour ludy Ruelas une bouteille remplie d'eau chaude, un coussin chauffant réglé à basse température ou un linge chaud guerita [facility]. Ne vous couchez jamais avec un coussin chauffant contre vous. ? Lorsque vous utilisez du froid, placez de la glace ou une angelina de froid guerita la zone par tranches de 10 à 20 minutes. Placez un linge fin entre la glace et votre peau. · Si possible, évitez de vous asseoir, sauf si vous vous sentez [employer]. · Alternez les moments allongé(e) et les promenades. Augmentez la distance de marche sans aggraver marisela symptômes. · Ne faites rien thanh accentue marisela symptômes. Quand devez-vous demander de l'aide ? Appelez le 911 à tout moment, si vous pensez avoir [facility] de [employer]. Par exemple, appelez si :  · Vous n'arrivez plus du tout à bouger une de marisela [place]. Appelez votre médecin immédiatement ou obtenez rapidement jaqueline soins médicaux si :  · Si vous avez de nouveaux symptômes ou jaqueline symptômes plus graves dans marisela jambes ou au niveau jaqueline fesses. Les symptômes peuvent comprendre :  ? Engourdissement ou picotements. ? Faiblesse. ? Douleur. · Perte de contrôle de la vessie ou jaqueline intestins. Observez attentivement tout changement de votre état de santé et veillez à contacter votre médecin si :  · Si votre état ne s'améliore pas. Où obtenir plus dinformations ?  Aller   [email or web address]  Avaya recherche Z239 dans la zone prévue à cet effet pour en savoir plus \"Sciatique : recommandations de soin. \"  Sharron Fortune le: 1 julucerot 2021               Version du contenu: 13.1  © 0350-4573 [facility], [place] [employer]. Les instructions ont été adaptées sous licence par Alexandra-Kandice de santé. Pour toute question guerita un état de santé ou guerita jose angel instructions, demandez toujours lpoonam de votre professionnel de santé. Healthwise, Incorporated décline toute garantie ou responsabilité quant à votre utilisation de jose angel informations.          Patient Education        Sciatique : exercices  Sciatica: Exercises  Introduction  Voici quelques exemples d'exercices de rééducation courants pour votre état de santé. Commencez chaque exercice doucement. Ralentissez l'exercice si vous commencez à ressentir de la douleur. Votre médecin ou votre kinésithérapeute vous indiquera quand commencer à les pratiquer et lesquels seront plus efficaces pour vous. Lorsque vous n'êtes pas actif, trouvez une position confortable pour vous reposer. Certaines personnes sont à l'aise guerita le sol ou guerita un lit moyennement ferme avec un petit oreiller sous la tête et un autre sous les genoux. D'autres préfèrent s'allonger guerita le côté avec un oreiller entre les genoux. Ne restez pas trop [employer] même position. Faites de courtes promenades (10 à 20 minutes), toutes les 2 ou 3 heures. Évitez les pentes, les collines et les escaliers jusqu'à ce que vous vous sentiez mieux. Marchez Col[place], ElephantDrive and Group 47 jaqueline distances que vous pouvez gérer sans ressentir de douleur, en Gerber System. Comment pratiquer les exercices  [employer]    1. Guerita le sol, mettez-vous à quatre pattes. 2. Relâchez votre tête et laissez-la tomber. Arrondissez Ross Stores vers le plafond jusqu'à ressentir un étirement agréable dans la partie supérieure, au milieu et Canon [employer]. Maintenez cet étirement tant que caleb est confortable, ou pendant environ 15 à 30 secondes. 3. Revenez à la position de [facility], [employer], à quatre pattes. 4. Nikolay David en poussant votre ventre vers le sol. Levez les fesses vers le plafond. 5. Maintenez cette position entre 15 à 30 secondes. 6. Recommencez entre 2 et 4 fois. Les soins de suivi sont essentiels pour votre bronwyn-être et Toll Brothers. Veuillez vous rendre à tous les rendez-vous et appelez votre médecin si vous Charles Schwab. Il est également judicieux de connaître tous marisela résultats d'examens et de Terex Corporation jaqueline médicaments que vous prenez.   Où obtenir plus dinformations ?  Aller   [email or web address]  [employer]'Lakes U715 dans la zone prévue à cet effet pour en savoir plus \"Sciatique : exercices. \"  Loreta Wong le: 1 gerbert 2021               Version du contenu: 13.1  © 0110-7431 [email or web address], [email or web address]. Les instructions ont été adaptées sous licence par [employer] Devices de santé. Pour toute question guerita un état de santé ou guerita jose angel instructions, demandez toujours lpoonam de votre professionDuke Regional Hospital de santé. Healthwise, Incorporated décline toute garantie ou responsabilité quant à votre utilisation de jose angel informations. Patient Education        Aponévrosite plantaire : recommandations de soin  Plantar Fasciitis: Care Instructions  Marisela recommandations de soin     L'aponévrosite plantaire est une inflammation douloureuse du fascia plantaire, le tissu se trouvant sous le pied thanh relie le naima aux orteils. Le fascia plantaire soutient également la voûte plantaire. Lorsque cette Vedantuer Corporation est endommagée, de petites blessures peuvent se former et entraîner [facility] Worldwide naima lorsque vous marchez ou restez debout. L'aponévrosite plantaire peut être due à la pratique de la course ou d'autres sports. Jyothi peut également survenir chez les personnes en surpoids ou ayant les pieds creux ou plats. Vous pouvez également la développer si vous marchez ou rester debout pendant de longs moments ou avez le tendon d'Slade ou les muscles du mollet contractés. Vous pouvez soulager la douleur au pied par du repos et d'autres soins à domicile. Votre pied Assurant de quelques semaines à quelques mois pour guérir totalement. Les soins de suivi sont essentiels pour votre bronwyn-être et Toll Brothers. Veuillez vous rendre à tous les rendez-vous et appelez votre médecin si vous Charles Schwab. Il est également judicieux de connaître tous marisela résultats d'examens et de Terex Corporation jaqueline médicaments que vous prenez.   Comment prendre soin de vous à la perla ? · Reposez souvent votre pied. Réduisez marisela activités à un niveau permettant d'éviter la douleur. Dans la Unisys Corporation possible, ne courez pas ou ne marchez pas guerita oliver surfaces dures. · Prenez oliver [employer]W [employer]otive exactement tels qu'ils vous ont été prescrits. ? Si le médecin a établi une ordonnance de [employer]W [employer], prenez-les tels que prescrit. ? Si vous ne prenez pas de médicaments antidouleur guerita ordonnance, prenez un anti-inflammatoire sans ordonnance contre la douleur et le gonflement, tel que de l'ibuprofène (Advil, Motrin), ou du naproxène (Aleve). Lisez et conformez-vous aux instructions de la notice. · Faites-vous un massage avec de la glace pour soulager la douleur et le gonflement. Galo Grim utiliser un glaçon ou un verre glacé plusieurs fois par Caremark Rx. Pour faire un verre glacé, Samira Company un gobelet en carton d'eau et congelez-le. Découpez la partie supérieure du gobelet pour faire dépasser un marlee pouce de glace. Attrapez le verre par la partie de papier restante. Massez la zone Capital One glace en petits cercles pendant 5 à 7 minutes. · Oliver moane alternés, eau chaude/eau froide, peuvent également aider à réduire le gonflement. Mais comme la chaleur seule peut accentuer la douleur et le gonflement, terminez les monae alternés par de l'eau froide. · La nuit, portez une attelle si le médecin vous le suggère. Lori permet de Safeway Inc votre pied avec les orteils vers le haut et un angle de 90° avec voOhioHealth Shelby Hospital [facility]. Cette position apporte à l'arrière de votre pied un étirement constant et doux. · Effectuez oliver exercices simples tels que oliver Pathmark Stores et oliver étirements avec une serviette 2 à 3 fois par jour, en particulier au lever, Charter Communications. Eyad exercices permettent au fascia plantaire de devenir plus souple. Il renforce également les muscles thanh soutiennent la voûte plantaire. Maintenez chaque étirement pendant 15 à 30 secondes.  Recommencez entre 2 et 4 fois.  ? Placez-vous debout à 1 pied [facility]. Placez la paume jaqueline ONEOK mur, au niveau de la poitrine. Penchez-vous contre [facility], en gardant une jambe tendue, genou tendu et naima contre le sol tout en pliant le genou de l'[facility]. ? Asseyez-vous guerita le sol ou guerita un tapis avec les pieds en flexion face à vous. Roulez une serviette Nabor Hannifin sens de la longueur et passez-la guerita la Enbridge Energy. En tenant chaque extrémité de la serviette, tirez doucement dessus pour [facility]. · Portez jaqueline chaussures thanh supportent bronwyn la voûte plantaire. Privilégiez les chaussures de sport ou celles avec une semelle bronwyn épaisse. · Essayez les talonnettes ou jaqueline orthèses à insérer dans la chaussure pour amortir le naima. Vous pouvez en acheter [facility] Group plupart jaqueline magasins de chaussures. · Enfilez marisela chaussures dès que vous [employer] Company. Marcher pieds nus ou putnam jaqueline chaussons peut accentuer la douleur. · Essayez d'atteindre et de maintenir un poids normal par rapport à votre taille pour exercer moins de pression guerita marisela pieds. Quand devez-vous demander de l'aide ? Appelez votre médecin immédiatement ou obtenez rapidement jaqueline [facility] si :  · Vous avez une douleur dans le naima accompagnée de fièvre, de rougeurs ou d'une sensation de chaleur dans le naima. · Vous ne pouvez mettre aucun NASH Energy douloureux. Observez attentivement tout changement de votre état de santé et veillez à contacter votre médecin lorsque :  · Votre naima est engourdi ou vous ressentez jaqueline picotements. · La douleur au naima dure plus de 2 semaines. Où obtenir plus dinformations ?  Aller   [email or web address]  Avaya recherche X351 dans la zone prévue à cet effet pour en savoir plus \"Aponévrosite plantaire : recommandations de soin. \"  Radha Monge le: 1 juillet 2021               Version du contenu: 13.1  © 3466-4626 [employer], [place] [employer].    Les instructions ont été adaptées sous licence par "StreetShares, Inc.". Pour toute question guerita un état de santé ou guerita jose angel instructions, demandez toujours lpoonam de voMultiCare Allenmore Hospital de santé. Healthwise, Incorporated décline toute garantie ou responsabilité quant à votre utilisation de jose angel informations. Patient Education        Aponévrosite plantaire : exercices  Plantar Fasciitis: Exercises  Introduction  Voici quelques exemples d'exercices à Brooke Tenorio. Jose Angel exercices peuvent être conseillés pour [employer] ou de la rééducation. Commencez chaque exercice doucement. Ralentissez les exercices si vous commencez à ressentir de la douleur. Il vous sera indiqué quand commencer à les pratiquer et lesquels seront plus efficaces pour vous. Comment pratiquer les exercices  Exercice de la serviette    1. Asseyez-vous jambes et genoux tendus. 2. Passez une serviette autour de marisela pieds, jorje en dessous jaqueline orteils. 3. Maintenez chaque extrémité de la serviette dans [facility], placées au-dessus jaqueline genoux. 4. Penchez-vous en arrière pour que la serviette tirent les pieds vers vous. 5. Maintenez la position pendant au moins 15 à 30 secondes. 6. Recommencez l'exercice entre 2 et 4 fois, jusqu'à 5 fois par Caremark Rx. Étirement du Xcel Energy exercice étire les muscles à l'arrière de la partie inférieure de la jambe (le mollet) ainsi que le tendon d'[place]. Faites cet exercice 3 à 4 fois par jour, 5 jours par semaine. 1. Debout face à un mur, [address] [facility] guerita le mur à hauteur jaqueline yeux. Placez la jambe que vous souhaitez étirer à environ un pas derrière l'On license of [facility]. 2. En maintenant votre naima au sol, pliez votre genou shy jusqu'à sentir un étirement dans la jambe arrière. 3. Maintenez l'étirement entre 15 à 30 secondes. Recommencez entre 2 et 4 fois. Étirement du fascia plantaire et du [employer] Lots    Étirer le fascia plantaire et les muscles du Big Lots peut améliorer la souplesse et diminuer la douleur au naima.  Vous pouvez effectuer cet exercice plusieurs fois par jour et shy et après votre activité physique. 1. Mettez-vous debout guerita NIKE tel qu'illustré ci-dessus. Tenez-vous à la rampe. 2. Faites descendre doucement marisela talons vers le bas, guerita le bord de la marche tout en décontractant les muscles de marisela mollets. Vous devez ressentir un léger étirement, sous votre pied et tout le long de l'arrière de votre jambe jusqu'au genou. 3. Étirez-vous pendant 15 à 30 secondes puis contracter légèrement les muscles de marisela mollets pour remonter marisela talons au niveau de la [place]. Recommencez entre 2 et 4 fois. Plis de serviette    Pour rendre cet exercice un peu plus difficile, placez un objet lourd, une boîte de conserve par [facility], à l'autre extrémité de la serviette. 1. Titus(e), posez votre pied guerita UGI Corporation serviette étendue guerita le sol et ramenez-la vers vous en utilisant marisela orteils. 2. Puis, toujours avec marisela orteils, éloignez la serviette. Attraper jaqueline billes    1. Déposez jaqueline CarMax sol près Masco ShopRunner. 2. À l'aide de marisela orteils, essayez de PureSense et de les American Electric Power steven. Les soins de suivi sont essentiels pour votre bronwyn-être et Toll Brothers. Veuillez vous rendre à tous les rendez-vous et appelez votre médecin si vous Charles Schwab. Il est également judicieux de connaître tous marisela résultats d'examens et de Terex Corporation jaqueline médicaments que vous prenez. Où obtenir plus dinformations ?  Aller   [email or web address]  Avaya recherche Q247 dans la zone prévue à cet effet pour en savoir plus \"Aponévrosite plantaire : exercices. \"  Pamela Pena le: 1 juillet 2021               Version du contenu: 13.1  © 1438-5044 [facility] Hospital[facility], [facility]. Les instructions ont été adaptées sous licence par [employer]Scoutard de santé. Pour toute question guerita un état de santé ou guerita jose angel instructions, demandez toujours lpoonam de votre professionnel de santé.  Healthwise, Incorporated décline toute garantie ou responsabilité quant à votre utilisation de jose angel informations. Patient Education        Suivi médical de la femme entre 50et 72 ans : recommandations de soin  Well Visit, Women 48 to 72: Care Instructions  Généralités  Les consultations générales peuvent vous aider à rester en bonne santé. Votre médecin examine votre état de santé général et peut vous suggérer jaqueline [facility] de prendre soin de vous. Votre médecin peut également vous recommander jaqueline tests. Chez vous, vous pouvez prévenir l'apparition de maladies grâce à une bonne alimentation, une activité physique régulière et d'autres étapes. Les soins de suivi sont essentiels pour votre bronwyn-être et Toll Brothers. Veuillez vous rendre à tous les rendez-vous et appelez votre médecin si vous Charles Schwab. Il est également judicieux de connaître tous marisela résultats d'examens et de Terex Corporation jaqueline médicaments que vous prenez. Comment prendre soin de vous à la perla ? · Faites jaqueline tests de dépistage que vous décidez avec votre médecin. Tarah Loza aide à détecter jaqueline maladies shy l'apparition de symptômes. · Mangez bridgette. Choisissez jaqueline fruits, jaqueline légumes, jaqueline céréales complètes, jaqueline protéines, et jaqueline produits laitiers allégés. Limitez les graisses, en Black Foods graisses saturées. Réduisez le [facility] alimentation. · Limitez l'alcool. Limitez votre consommation à 1 verre par jour, ou 7 par semaine. · Faites au moins 30 minutes d'exercice la plupart jaqueline jours de la semaine. La marche est un bon choix. Vous pouvez également faire d'autres activités angela que la course à pied, la natation, le cyclisme, [employer] States [employer] tennis ou jaqueline sports d'équipe. · Atteignez un poids santé et gardez-le. Hellen vous permettra de réduire le risque de nombreux problèmes tels que l'obésité, le diabète, les maladies cardiaques et l'hypertension artérielle. · Ne fumez pas. Le tabagisme peut faire Chance Natalie problèmes de santé.  Si vous avez besoin d'aide pour [employer]way de fumer, [facility] Airlines sujet de programmes de lutte contre le tabagisme et de médicaments utilisé pour le sevrage tabagique. Ceux-ci peuvent augmenter marisela chances d'arrêter définitivement le tabac. · Prenez soin de YUM! Brands. Il est facile de se laisser submerger par l'inquiétude et le stress. Adoptez jaqueline stratégies visant à gérer le stress angela que la respiration profonde et la pleine conscience, et restez en lien avec votre famille et Textron Inc. Si vous pensez que vous vous sentez souvent imani ou désespéré, parlez-en à Borders Group. Un traitement peut vous aider. · Parlez à votre médecin si vous présentez jaqueline facteurs de risque d'infections sexuellement transmissibles (IST). Vous pouvez prévenir les IST si vous attendez, shy d'avoir un rapport sexuel avec un ou jaqueline nouveaux partenaires, d'avoir chacun été testé pour les IST. Il est également utile d'utiliser jaqueline préservatifs (masculins ou féminins) et de limiter les partenaires sexuels à une personne thanh a jaqueline rapports sexuels exclusivement avec vous. Il existe jaqueline vaccins pour Arrow Electronics. · Si vous pensez avoir un problème avec la consommation d'alcool ou de drogues, parlez-en à votre médecin. Hellen comprend les [employer]oles[employer] ordonnance (tels que les amphétamines et les opioïdes) et les drogues illégales (angela que la cocaïne et la méthamphétamine). Votre médecin peut vous aider à trouver le traitement thanh vous [employer]s. · Protégez votre peau du [facility]. Lorsque vous êtes à l'extérieur entre 10 h et 16 h, restez à l'ombre ou couvrez-vous avec un vêtement et un chapeau à large bord. Portez jaqueline lunettes de jen thanh bloquent les rayons UV. Même sous un ciel nuageux, mettez de l'écran solaire à large spectre (SPF 30 ou plus) guerita une peau exposée. · Consultez un dentiste une ou deux fois par an pour UGI Corporation vérification et pour un nettoyage de marisela dents. · Portez une ceinture de sécurité en voiture.   Serene Eaton devez-vous appeler pour demander de l'aide ? Observez attentivement tout changement de votre état de santé et contactez votre médecin si vous avez jaqueline problèmes ou souffrez de symptômes thanh vous inquiètent. Où obtenir plus dinformations ?  Aller   [email or web address]  Sukhjinder recherche V231 dans la zone prévue à cet effet pour en savoir plus \"Suivi médical de la femme entre 50et 72 ans : recommandations de soin. \"  Rosalind Smith le: 6 octobre 2021               Version du contenu: 13.1  © 3734-5595 [employer], [employer]. Les instructions ont été adaptées sous licence par [place]-Kandice de santé. Pour toute question guerita un état de santé ou guerita jose angel instructions, demandez toujours lpoonam de votre professionnel de santé. Healthwise, Incorporated décline toute garantie ou responsabilité quant à votre utilisation de jose angel informations.

## 2022-02-10 ENCOUNTER — HOSPITAL ENCOUNTER (OUTPATIENT)
Age: 62
Discharge: HOME | End: 2022-02-10
Payer: COMMERCIAL

## 2022-02-10 DIAGNOSIS — Z11.59 NEED FOR HEPATITIS C SCREENING TEST: ICD-10-CM

## 2022-02-10 DIAGNOSIS — I10 PRIMARY HYPERTENSION: ICD-10-CM

## 2022-02-10 DIAGNOSIS — Z11.4 SCREENING FOR HIV (HUMAN IMMUNODEFICIENCY VIRUS): ICD-10-CM

## 2022-02-10 DIAGNOSIS — Z23 NEED FOR VACCINATION: ICD-10-CM

## 2022-02-10 LAB
A/G RATIO: 1.5 (ref 1.1–2.2)
ALBUMIN SERPL-MCNC: 4.3 G/DL (ref 3.4–5)
ALP BLD-CCNC: 75 U/L (ref 40–129)
ALT SERPL-CCNC: 14 U/L (ref 10–40)
ANION GAP SERPL CALCULATED.3IONS-SCNC: 14 MMOL/L (ref 3–16)
AST SERPL-CCNC: 17 U/L (ref 15–37)
BASOPHILS ABSOLUTE: 0 K/UL (ref 0–0.2)
BASOPHILS RELATIVE PERCENT: 0.6 %
BILIRUB SERPL-MCNC: 0.3 MG/DL (ref 0–1)
BUN BLDV-MCNC: 16 MG/DL (ref 7–20)
CALCIUM SERPL-MCNC: 9.6 MG/DL (ref 8.3–10.6)
CHLORIDE BLD-SCNC: 101 MMOL/L (ref 99–110)
CHOLESTEROL, TOTAL: 286 MG/DL (ref 0–199)
CO2: 25 MMOL/L (ref 21–32)
CREAT SERPL-MCNC: 0.8 MG/DL (ref 0.6–1.1)
EOSINOPHILS ABSOLUTE: 0.1 K/UL (ref 0–0.6)
EOSINOPHILS RELATIVE PERCENT: 1.6 %
GFR AFRICAN AMERICAN: >60
GFR NON-AFRICAN AMERICAN: >60
GLUCOSE BLD-MCNC: 101 MG/DL (ref 70–99)
HCT VFR BLD CALC: 37.8 % (ref 36–48)
HDLC SERPL-MCNC: 44 MG/DL (ref 40–60)
HEMOGLOBIN: 12.7 G/DL (ref 12–16)
LDL CHOLESTEROL CALCULATED: 218 MG/DL
LYMPHOCYTES ABSOLUTE: 2.6 K/UL (ref 1–5.1)
LYMPHOCYTES RELATIVE PERCENT: 50.5 %
MCH RBC QN AUTO: 31.4 PG (ref 26–34)
MCHC RBC AUTO-ENTMCNC: 33.6 G/DL (ref 31–36)
MCV RBC AUTO: 93.4 FL (ref 80–100)
MONOCYTES ABSOLUTE: 0.5 K/UL (ref 0–1.3)
MONOCYTES RELATIVE PERCENT: 8.9 %
NEUTROPHILS ABSOLUTE: 2 K/UL (ref 1.7–7.7)
NEUTROPHILS RELATIVE PERCENT: 38.4 %
PDW BLD-RTO: 13.3 % (ref 12.4–15.4)
PLATELET # BLD: 329 K/UL (ref 135–450)
PMV BLD AUTO: 7.8 FL (ref 5–10.5)
POTASSIUM SERPL-SCNC: 4.6 MMOL/L (ref 3.5–5.1)
RBC # BLD: 4.04 M/UL (ref 4–5.2)
SODIUM BLD-SCNC: 140 MMOL/L (ref 136–145)
T4 FREE: 1 NG/DL (ref 0.9–1.8)
TOTAL PROTEIN: 7.1 G/DL (ref 6.4–8.2)
TRIGL SERPL-MCNC: 122 MG/DL (ref 0–150)
TSH REFLEX: 1.41 UIU/ML (ref 0.27–4.2)
VLDLC SERPL CALC-MCNC: 24 MG/DL
WBC # BLD: 5.2 K/UL (ref 4–11)

## 2022-02-10 PROCEDURE — 84439 ASSAY OF FREE THYROXINE: CPT

## 2022-02-10 PROCEDURE — 83036 HEMOGLOBIN GLYCOSYLATED A1C: CPT

## 2022-02-10 PROCEDURE — 86702 HIV-2 ANTIBODY: CPT

## 2022-02-10 PROCEDURE — 84443 ASSAY THYROID STIM HORMONE: CPT

## 2022-02-10 PROCEDURE — 80061 LIPID PANEL: CPT

## 2022-02-10 PROCEDURE — 86803 HEPATITIS C AB TEST: CPT

## 2022-02-10 PROCEDURE — 86787 VARICELLA-ZOSTER ANTIBODY: CPT

## 2022-02-10 PROCEDURE — 85025 COMPLETE CBC W/AUTO DIFF WBC: CPT

## 2022-02-10 PROCEDURE — 86701 HIV-1ANTIBODY: CPT

## 2022-02-10 PROCEDURE — 87390 HIV-1 AG IA: CPT

## 2022-02-10 PROCEDURE — 36415 COLL VENOUS BLD VENIPUNCTURE: CPT

## 2022-02-10 PROCEDURE — 80053 COMPREHEN METABOLIC PANEL: CPT

## 2022-02-11 DIAGNOSIS — B19.20 HEPATITIS C TEST POSITIVE: Primary | ICD-10-CM

## 2022-02-11 DIAGNOSIS — E78.00 PURE HYPERCHOLESTEROLEMIA: ICD-10-CM

## 2022-02-11 PROBLEM — I10 HTN (HYPERTENSION): Status: RESOLVED | Noted: 2022-02-09 | Resolved: 2022-02-11

## 2022-02-11 LAB
ESTIMATED AVERAGE GLUCOSE: 114 MG/DL
HBA1C MFR BLD: 5.6 %
HEPATITIS C ANTIBODY INTERPRETATION: REACTIVE
HIV AG/AB: NORMAL
HIV ANTIGEN: NORMAL
HIV-1 ANTIBODY: NORMAL
HIV-2 AB: NORMAL
VARICELLA-ZOSTER VIRUS AB, IGG: NORMAL

## 2022-02-11 RX ORDER — ATORVASTATIN CALCIUM 80 MG/1
80 TABLET, FILM COATED ORAL DAILY
Qty: 90 TABLET | Refills: 1 | Status: SHIPPED | OUTPATIENT
Start: 2022-02-11

## 2022-02-15 DIAGNOSIS — M54.31 SCIATICA OF RIGHT SIDE: ICD-10-CM

## 2022-02-15 RX ORDER — IBUPROFEN 800 MG/1
800 TABLET ORAL
Qty: 90 TABLET | Refills: 0 | Status: SHIPPED | OUTPATIENT
Start: 2022-02-15 | End: 2022-06-24 | Stop reason: SDUPTHER

## 2022-02-15 RX ORDER — CYCLOBENZAPRINE HCL 10 MG
10 TABLET ORAL 3 TIMES DAILY PRN
Qty: 30 TABLET | Refills: 0 | Status: SHIPPED | OUTPATIENT
Start: 2022-02-15 | End: 2022-02-25

## 2022-02-15 NOTE — TELEPHONE ENCOUNTER
----- Message from Rema Loza sent at 2/15/2022  9:47 AM EST -----  Subject: Message to Provider    QUESTIONS  Information for Provider? send prescription to [facility],   demetrius doesnt take her insurance  ---------------------------------------------------------------------------  --------------  Brandy Faes INFO  What is the best way for the office to contact you? OK to leave message on   voicemail  Preferred Call Back Phone Number? [phone]  ---------------------------------------------------------------------------  --------------  SCRIPT ANSWERS  Relationship to Patient?  Self

## 2022-02-15 NOTE — TELEPHONE ENCOUNTER
Medication:   Requested Prescriptions     Pending Prescriptions Disp Refills    ibuprofen (ADVIL;MOTRIN) 800 MG tablet 90 tablet 0     Sig: Take 1 tablet by mouth 3 times daily (with meals)    cyclobenzaprine (FLEXERIL) 10 MG tablet 30 tablet 0     Sig: Take 1 tablet by mouth 3 times daily as needed for Muscle spasms        Last Filled:  2/9/2022, 30, 0  2/9/2022, 90, 0    Patient Phone Number: [phone] (home)     Last appt: 2/9/2022   Next appt: Visit date not found    Last OARRS: No flowsheet data found.

## 2022-03-02 ENCOUNTER — HOSPITAL ENCOUNTER (OUTPATIENT)
Age: 62
Discharge: HOME | End: 2022-03-02
Payer: COMMERCIAL

## 2022-03-02 PROCEDURE — 87522 HEPATITIS C REVRS TRNSCRPJ: CPT

## 2022-03-06 LAB
HCV QNT BY NAAT IU/ML: NOT DETECTED IU/ML
HCV QNT BY NAAT LOG IU/ML: NOT DETECTED LOG IU/ML
INTERPRETATION: NOT DETECTED

## 2022-03-09 ENCOUNTER — TELEPHONE (OUTPATIENT)
Dept: FAMILY MEDICINE CLINIC | Age: 62
End: 2022-03-09

## 2022-04-13 ENCOUNTER — TELEPHONE (OUTPATIENT)
Dept: FAMILY MEDICINE CLINIC | Age: 62
End: 2022-04-13

## 2022-06-24 ENCOUNTER — OFFICE VISIT (OUTPATIENT)
Dept: FAMILY MEDICINE CLINIC | Age: 62
End: 2022-06-24
Payer: COMMERCIAL

## 2022-06-24 VITALS
SYSTOLIC BLOOD PRESSURE: 132 MMHG | DIASTOLIC BLOOD PRESSURE: 80 MMHG | OXYGEN SATURATION: 98 % | HEART RATE: 70 BPM | WEIGHT: 187 LBS | HEIGHT: 67 IN | BODY MASS INDEX: 29.35 KG/M2

## 2022-06-24 DIAGNOSIS — M54.31 SCIATICA OF RIGHT SIDE: ICD-10-CM

## 2022-06-24 DIAGNOSIS — R05.9 COUGH: Primary | ICD-10-CM

## 2022-06-24 LAB
INFLUENZA A ANTIGEN, POC: NORMAL
INFLUENZA B ANTIGEN, POC: NORMAL

## 2022-06-24 PROCEDURE — 87804 INFLUENZA ASSAY W/OPTIC: CPT | Performed by: NURSE PRACTITIONER

## 2022-06-24 PROCEDURE — 99213 OFFICE O/P EST LOW 20 MIN: CPT | Performed by: NURSE PRACTITIONER

## 2022-06-24 RX ORDER — IBUPROFEN 800 MG/1
800 TABLET ORAL
Qty: 90 TABLET | Refills: 0 | Status: SHIPPED | OUTPATIENT
Start: 2022-06-24

## 2022-06-24 SDOH — ECONOMIC STABILITY: FOOD INSECURITY: WITHIN THE PAST 12 MONTHS, THE FOOD YOU BOUGHT JUST DIDN'T LAST AND YOU DIDN'T HAVE MONEY TO GET MORE.: NEVER TRUE

## 2022-06-24 SDOH — ECONOMIC STABILITY: FOOD INSECURITY: WITHIN THE PAST 12 MONTHS, YOU WORRIED THAT YOUR FOOD WOULD RUN OUT BEFORE YOU GOT MONEY TO BUY MORE.: NEVER TRUE

## 2022-06-24 ASSESSMENT — PATIENT HEALTH QUESTIONNAIRE - PHQ9
SUM OF ALL RESPONSES TO PHQ QUESTIONS 1-9: 0
SUM OF ALL RESPONSES TO PHQ9 QUESTIONS 1 & 2: 0
SUM OF ALL RESPONSES TO PHQ QUESTIONS 1-9: 0
1. LITTLE INTEREST OR PLEASURE IN DOING THINGS: 0
SUM OF ALL RESPONSES TO PHQ QUESTIONS 1-9: 0
SUM OF ALL RESPONSES TO PHQ QUESTIONS 1-9: 0
2. FEELING DOWN, DEPRESSED OR HOPELESS: 0

## 2022-06-24 ASSESSMENT — ENCOUNTER SYMPTOMS
RHINORRHEA: 1
SINUS PRESSURE: 1
SORE THROAT: 0
CHEST TIGHTNESS: 0
COUGH: 1
SHORTNESS OF BREATH: 1

## 2022-06-24 ASSESSMENT — SOCIAL DETERMINANTS OF HEALTH (SDOH): HOW HARD IS IT FOR YOU TO PAY FOR THE VERY BASICS LIKE FOOD, HOUSING, MEDICAL CARE, AND HEATING?: NOT HARD AT ALL

## 2022-06-24 NOTE — PATIENT INSTRUCTIONS
Patient Education        La toux : recommandations de soin  Cough: Care Instructions  Marisela recommandations de soin     La toux est la réponse de votre corps à quelque chose thanh gêne votre gorge ou les voies respiratoires. De nombreux facteurs en sont à l'origine. Vous pouvez tousser à cause d'un rhume ou d'une grippe, d'une bronchite ou de l'asthme. Fumer, un écoulement rhinopharyngien, jaqueline allergies et les remontées acidesdans la [employer] provoquer la toux. La toux est un symptôme, pas [employer]. La plupart jaqueline toux cessent lorsque la cause, un rhume par Haylie Glory. Vous pouvez prendre [facility] [employer] [employer] domicile pour tousser moins et vous sentir mieux. Les soins de suivi sont essentiels pour votre bronwyn-être et [employer]. Veuillez vous rendre à tous les rendez-vous et appelez votre médecin si vous [employer]. Il est également judicieux de connaître tous marisela résultatsd'examens et de [employer] que vous prenez. Comment prendre soin de vous à la perla ?  Buvez beaucoup d'eau et [employer]. Lori permet d'affiner les mucosités et de [employer] gorge sèche ou douloureuse. De l'eau chaude ou du thé [employer] miel ou du jus de citron peuvent soulager la toux sèche.  Prenez les médicaments contre la toux tels que prescrits par [employer].  Surélevez votre tête avec jaqueline [employer] pour vous aider à respirer et à soulager la toux sèche.  [facility] Essayez les pastilles contre la toux pour [employer] gorge sèche ou douloureuse. Les pastilles contre la toux n'arrêtent pas la toux. Les pastilles ayant un goût de médicament ne valent pas mieux que celles ayant un goût de bonbons ou que les bonbons à sucer.  Ne fumez pas. Évitez le tabagisme passif. Si vous avez besoin d'aide pour arrêter de fumer, parlez à [employer] jaqueline programmes d'aide et jaqueline médicaments pour arrêter de fumer.  Ils permettent de mettre toutes les chances de votre côté pour arrêter pour de bon. Quand devez-vous demander de l'aide ? Appelez le 911 à tout moment, si vous pensez avoir besoin de [place]. Par exemple,appelez si :  Juan A Poet de grandes difficultés à respirer. Appelez votre médecin immédiatement ou Kane Clackamas rapidement jaqueline soins médicaux si :  Marlyce Pion du sang.  Vous ressentez une nouvelle difficulté à respirer ou pedro s'aggrave.  Vous avez de la fièvre ou pedro augmente.  Vous avez une nouvelle éruption. Observez attentivement tout changement de votre état de santé et veillez àcontacter votre médecin si :   Vous toussez plus intensément ou plus souvent, en particulier si vous remarquez davantage de mucosités ou un changement de couleur de marisela mucosités.  Vous avez de nouveaux symptômes, tel qu'un mal de gorge, une otite ou [employer] sinus.  Votre état ne s'améliore pas comme prévu. Où obtenir plus dinformations ?  Aller   http://www.woods.com/  [employer] recherche D279 dans la zone prévue à cet effet pour en savoir plus \"La toux : recommandations de soin. \"  Geraldene Morning le: 9 mars 2022               Version du contenu: 13.3  © 8064-9294 Healthwise, [employer]. Les instructions ont été adaptées sous licence par LiveAir Networksard de santé. Pour toute question guerita un état de santé ou guerita jose angel instructions, demandez toujours lpoonam de votre professionnel de santé. Healthwise, Incorporated décline toute garantie ou responsabilité quant à votre utilisationde jose angel informations.

## 2022-06-24 NOTE — LETTER
[address]  Phone: [phone]  Fax: [phone]    SARAH Beasley CNP        June 24, 2022     Patient: Earline Thompson   YOB: 1960   Date of Visit: 6/24/2022       To Whom It May Concern: It is my medical opinion that Earline Thompson has a negative health reaction to cigarette smoke. 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 If you have any questions or concerns, please don't hesitate to call.     Sincerely,        SARAH Beasley CNP

## 2022-06-24 NOTE — PROGRESS NOTES
2022     Dean Benson (:  1960) is a 63 y.o. female, here for evaluation of the following medical concerns:    Chief Complaint   Patient presents with   [facility] Other     was around someone that smoked and it started bothering her, sneezing, coughing    Flank Pain     from sneezing and coughing, x5 days    Forms     copy of medical record for the    : [id number]    Cough:  Cough, sneezing and coughing for five days. She was exposed to cigarette smoke and has been sneezing and coughing since. She states her cough has subsided. She was having bilateral lower rib pain from coughing so much. No exposure to covid recently. Was last tested for covid two weeks ago. 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 She is requesting a copy of her chart for a  because she is an immigrant and trying to bring her children to the [address] [facility]. Her  is here also and requesting his records also. Review of Systems   Constitutional: Negative for chills, diaphoresis, fatigue and fever. HENT: Positive for congestion, postnasal drip, rhinorrhea, sinus pressure and sneezing. Negative for sore throat. Respiratory: Positive for cough and shortness of breath. Negative for chest tightness. Cardiovascular: Negative. Negative for chest pain. Neurological: Negative for headaches. Prior to Visit Medications    Medication Sig Taking?  Authorizing Provider   ibuprofen (ADVIL;MOTRIN) 800 MG tablet Take 1 tablet by mouth 3 times daily (with meals) Yes SARAH Winchester CNP   atorvastatin (LIPITOR) 80 MG tablet Take 1 tablet by mouth daily Yes SARAH Winchester CNP        Social History     Tobacco Use    Smoking status: Never Smoker    Smokeless tobacco: Never Used   Vaping Use    Vaping Use: Unknown   Substance Use Topics    Alcohol use: No    Drug use: Never        Vitals:    22 1317   BP: 132/80   Pulse: 70   SpO2: 98%   Weight: 187 lb (84.8 kg) Height: 5' 7\" (1.702 m)     Estimated body mass index is 29.29 kg/m² as calculated from the following:    Height as of this encounter: 5' 7\" (1.702 m). Weight as of this encounter: 187 lb (84.8 kg). Physical Exam  Vitals and nursing note reviewed. Constitutional:       General: She is not in acute distress. Appearance: Normal appearance. She is normal weight. She is not ill-appearing. Cardiovascular:      Rate and Rhythm: Normal rate and regular rhythm. Heart sounds: Normal heart sounds, S1 normal and S2 normal. No murmur heard. Pulmonary:      Effort: Pulmonary effort is normal.      Breath sounds: Normal breath sounds and air entry. Skin:     General: Skin is warm and dry. Capillary Refill: Capillary refill takes less than 2 seconds. Neurological:      General: No focal deficit present. Mental Status: She is alert. Psychiatric:         Mood and Affect: Mood normal.         ASSESSMENT/PLAN:  1. Cough  Push fluids  Rest  Tylenol/Motrin PRN pain/fever/body aches  Recommend drinking plenty of fluids, rest as much as possible, tylenol or motrin as needed for body aches, pain or fever. May also use other the counter cold and flu medications such as NyQuil or DayQuil as needed. If using OTC cough and cold medication avoid tylenol.  - COVID-19; Future  - POCT Influenza A/B Antigen (BD Veritor)    Return if symptoms worsen or fail to improve.

## 2022-06-25 LAB — SARS-COV-2: NOT DETECTED

## 2022-09-23 ENCOUNTER — NURSE TRIAGE (OUTPATIENT)
Dept: OTHER | Facility: CLINIC | Age: 62
End: 2022-09-23

## 2022-09-23 ENCOUNTER — TELEPHONE (OUTPATIENT)
Dept: FAMILY MEDICINE CLINIC | Age: 62
End: 2022-09-23

## 2022-09-23 NOTE — TELEPHONE ENCOUNTER
Received call from [facility] at [facility]- MELECIO with [employer] Complaint. Subjective: Caller states \"I have a cough\"     Current Symptoms: Dry cough and weakness. No SOB. Chest is sore from coughing. Cough is keeping her up thru the night. Onset: 2 days ago; unchanged    Associated Symptoms: reduced appetite, increased wakefulness    Temperature: denies fever     What has been tried: Theraflu-not helping    LMP: NA Pregnant: NA    Recommended disposition: Go to [facility]    Care advice provided, patient verbalizes understanding; denies any other questions or concerns; instructed to call back for any new or worsening symptoms. Writer provided warm transfer to [employer]  at [address] [place] [address] for second level triage. Attention Provider: Thank you for allowing me to participate in the care of your patient. The patient was connected to triage in response to information provided to the ECC/PSC. Please do not respond through this encounter as the response is not directed to a shared pool.       Reason for Disposition   MODERATE weakness (i.e., interferes with work, school, normal activities) and cause unknown  (Exceptions: Weakness with acute minor illness, or weakness from poor fluid intake.)    Protocols used: Weakness (Generalized) and Fatigue-ADULT-[place]

## 2022-09-23 NOTE — TELEPHONE ENCOUNTER
Patient is experiencing a dry cough and fatigue and coughing make chest hurt     Patient would like to know what she can take or do    [address] Kaylie Gifford

## 2023-03-23 ENCOUNTER — OFFICE VISIT (OUTPATIENT)
Dept: FAMILY MEDICINE CLINIC | Age: 63
End: 2023-03-23

## 2023-03-23 VITALS — HEART RATE: 70 BPM | DIASTOLIC BLOOD PRESSURE: 82 MMHG | SYSTOLIC BLOOD PRESSURE: 140 MMHG | OXYGEN SATURATION: 97 %

## 2023-03-23 DIAGNOSIS — Z63.4 GRIEF AT LOSS OF CHILD: ICD-10-CM

## 2023-03-23 DIAGNOSIS — I10 PRIMARY HYPERTENSION: ICD-10-CM

## 2023-03-23 DIAGNOSIS — F43.21 GRIEF AT LOSS OF CHILD: ICD-10-CM

## 2023-03-23 DIAGNOSIS — Z00.00 ANNUAL PHYSICAL EXAM: Primary | ICD-10-CM

## 2023-03-23 DIAGNOSIS — M54.31 SCIATICA OF RIGHT SIDE: ICD-10-CM

## 2023-03-23 RX ORDER — HYDROCHLOROTHIAZIDE 25 MG/1
25 TABLET ORAL EVERY MORNING
Qty: 90 TABLET | Refills: 1 | Status: SHIPPED | OUTPATIENT
Start: 2023-03-23

## 2023-03-23 RX ORDER — CYCLOBENZAPRINE HCL 10 MG
10 TABLET ORAL 3 TIMES DAILY PRN
Qty: 30 TABLET | Refills: 0 | Status: SHIPPED | OUTPATIENT
Start: 2023-03-23 | End: 2023-04-02

## 2023-03-23 RX ORDER — METHYLPREDNISOLONE 4 MG/1
TABLET ORAL
Qty: 1 KIT | Refills: 0 | Status: SHIPPED | OUTPATIENT
Start: 2023-03-23 | End: 2023-03-29

## 2023-03-23 RX ORDER — IBUPROFEN 600 MG/1
600 TABLET ORAL 4 TIMES DAILY PRN
Qty: 120 TABLET | Refills: 0 | Status: SHIPPED | OUTPATIENT
Start: 2023-03-23

## 2023-03-23 SDOH — ECONOMIC STABILITY: FOOD INSECURITY: WITHIN THE PAST 12 MONTHS, YOU WORRIED THAT YOUR FOOD WOULD RUN OUT BEFORE YOU GOT MONEY TO BUY MORE.: NEVER TRUE

## 2023-03-23 SDOH — SOCIAL STABILITY - SOCIAL INSECURITY: DISSAPEARANCE AND DEATH OF FAMILY MEMBER: Z63.4

## 2023-03-23 SDOH — ECONOMIC STABILITY: HOUSING INSECURITY
IN THE LAST 12 MONTHS, WAS THERE A TIME WHEN YOU DID NOT HAVE A STEADY PLACE TO SLEEP OR SLEPT IN A SHELTER (INCLUDING NOW)?: NO

## 2023-03-23 SDOH — ECONOMIC STABILITY: FOOD INSECURITY: WITHIN THE PAST 12 MONTHS, THE FOOD YOU BOUGHT JUST DIDN'T LAST AND YOU DIDN'T HAVE MONEY TO GET MORE.: NEVER TRUE

## 2023-03-23 SDOH — ECONOMIC STABILITY: INCOME INSECURITY: HOW HARD IS IT FOR YOU TO PAY FOR THE VERY BASICS LIKE FOOD, HOUSING, MEDICAL CARE, AND HEATING?: NOT VERY HARD

## 2023-03-23 ASSESSMENT — ENCOUNTER SYMPTOMS
CONSTIPATION: 0
SORE THROAT: 0
BLOOD IN STOOL: 0
COUGH: 0
DIARRHEA: 0
BACK PAIN: 1
SINUS PAIN: 0
ABDOMINAL PAIN: 0
VOMITING: 0
SINUS PRESSURE: 0
NAUSEA: 0
WHEEZING: 0
CHEST TIGHTNESS: 0
SHORTNESS OF BREATH: 0
EYES NEGATIVE: 1

## 2023-03-23 ASSESSMENT — PATIENT HEALTH QUESTIONNAIRE - PHQ9
SUM OF ALL RESPONSES TO PHQ QUESTIONS 1-9: 0
2. FEELING DOWN, DEPRESSED OR HOPELESS: 0
SUM OF ALL RESPONSES TO PHQ9 QUESTIONS 1 & 2: 0
1. LITTLE INTEREST OR PLEASURE IN DOING THINGS: 0

## 2023-03-23 NOTE — PROGRESS NOTES
sounds, S1 normal and S2 normal. No murmur heard. Pulmonary:      Effort: Pulmonary effort is normal.      Breath sounds: Normal breath sounds and air entry. Abdominal:      General: Abdomen is flat. Bowel sounds are normal.      Palpations: Abdomen is soft. Musculoskeletal:         General: Normal range of motion. Cervical back: Normal range of motion and neck supple. Right lower leg: No edema. Left lower leg: No edema. Lymphadenopathy:      Head:      Right side of head: No submental, submandibular, tonsillar, preauricular or posterior auricular adenopathy. Left side of head: No submental, submandibular, tonsillar, preauricular or posterior auricular adenopathy. Cervical: No cervical adenopathy. Upper Body:      Right upper body: No supraclavicular adenopathy. Left upper body: No supraclavicular adenopathy. Skin:     General: Skin is warm and dry. Capillary Refill: Capillary refill takes less than 2 seconds. Neurological:      General: No focal deficit present. Mental Status: She is alert and oriented to person, place, and time. GCS: GCS eye subscore is 4. GCS verbal subscore is 5. GCS motor subscore is 6. Psychiatric:         Attention and Perception: Attention and perception normal.         Mood and Affect: Mood is depressed. Affect is labile. Speech: Speech normal.         Behavior: Behavior normal. Behavior is cooperative. Thought Content: Thought content normal.         Judgment: Judgment normal.       ASSESSMENT/PLAN:  1. Annual physical exam    2. Primary hypertension  Unstable  DASH diet  Recommended at least 30 minutes of aerobic exercise daily.  - Hemoglobin A1C; Future  - Comprehensive Metabolic Panel, Fasting; Future  - CBC with Auto Differential; Future  - Lipid, Fasting; Future  - hydroCHLOROthiazide (HYDRODIURIL) 25 MG tablet; Take 1 tablet by mouth every morning  Dispense: 90 tablet; Refill: 1    3.  Grief at loss of

## 2023-04-03 ENCOUNTER — HOSPITAL ENCOUNTER (OUTPATIENT)
Age: 63
Discharge: HOME | End: 2023-04-03
Payer: MEDICAID

## 2023-04-03 DIAGNOSIS — I10 PRIMARY HYPERTENSION: ICD-10-CM

## 2023-04-03 LAB
ALBUMIN SERPL-MCNC: 4.2 G/DL (ref 3.4–5)
ALBUMIN/GLOB SERPL: 1.3 {RATIO} (ref 1.1–2.2)
ALP SERPL-CCNC: 92 U/L (ref 40–129)
ALT SERPL-CCNC: 14 U/L (ref 10–40)
ANION GAP SERPL CALCULATED.3IONS-SCNC: 12 MMOL/L (ref 3–16)
AST SERPL-CCNC: 17 U/L (ref 15–37)
BASOPHILS # BLD: 0 K/UL (ref 0–0.2)
BASOPHILS NFR BLD: 1 %
BILIRUB SERPL-MCNC: <0.2 MG/DL (ref 0–1)
BUN SERPL-MCNC: 20 MG/DL (ref 7–20)
CALCIUM SERPL-MCNC: 9.7 MG/DL (ref 8.3–10.6)
CHLORIDE SERPL-SCNC: 104 MMOL/L (ref 99–110)
CHOLEST SERPL-MCNC: 277 MG/DL (ref 0–199)
CO2 SERPL-SCNC: 28 MMOL/L (ref 21–32)
CREAT SERPL-MCNC: 0.8 MG/DL (ref 0.6–1.2)
DEPRECATED RDW RBC AUTO: 13.6 % (ref 12.4–15.4)
EOSINOPHIL # BLD: 0 K/UL (ref 0–0.6)
EOSINOPHIL NFR BLD: 0.9 %
GFR SERPLBLD CREATININE-BSD FMLA CKD-EPI: >60 ML/MIN/{1.73_M2}
GLUCOSE P FAST SERPL-MCNC: 105 MG/DL (ref 70–99)
HCT VFR BLD AUTO: 38.9 % (ref 36–48)
HDLC SERPL-MCNC: 54 MG/DL (ref 40–60)
HGB BLD-MCNC: 12.9 G/DL (ref 12–16)
LDL CHOLESTEROL CALCULATED: 205 MG/DL
LYMPHOCYTES # BLD: 2.3 K/UL (ref 1–5.1)
LYMPHOCYTES NFR BLD: 48.2 %
MCH RBC QN AUTO: 30.5 PG (ref 26–34)
MCHC RBC AUTO-ENTMCNC: 33.2 G/DL (ref 31–36)
MCV RBC AUTO: 91.9 FL (ref 80–100)
MONOCYTES # BLD: 0.4 K/UL (ref 0–1.3)
MONOCYTES NFR BLD: 8.4 %
NEUTROPHILS # BLD: 2 K/UL (ref 1.7–7.7)
NEUTROPHILS NFR BLD: 41.5 %
PLATELET # BLD AUTO: 337 K/UL (ref 135–450)
PMV BLD AUTO: 8.3 FL (ref 5–10.5)
POTASSIUM SERPL-SCNC: 4.4 MMOL/L (ref 3.5–5.1)
PROT SERPL-MCNC: 7.5 G/DL (ref 6.4–8.2)
RBC # BLD AUTO: 4.23 M/UL (ref 4–5.2)
SODIUM SERPL-SCNC: 144 MMOL/L (ref 136–145)
TRIGL SERPL-MCNC: 91 MG/DL (ref 0–150)
VLDLC SERPL CALC-MCNC: 18 MG/DL
WBC # BLD AUTO: 4.7 K/UL (ref 4–11)

## 2023-04-03 PROCEDURE — 83036 HEMOGLOBIN GLYCOSYLATED A1C: CPT

## 2023-04-03 PROCEDURE — 85025 COMPLETE CBC W/AUTO DIFF WBC: CPT

## 2023-04-03 PROCEDURE — 80053 COMPREHEN METABOLIC PANEL: CPT

## 2023-04-03 PROCEDURE — 80061 LIPID PANEL: CPT

## 2023-04-03 PROCEDURE — 36415 COLL VENOUS BLD VENIPUNCTURE: CPT

## 2023-04-04 LAB
EST. AVERAGE GLUCOSE BLD GHB EST-MCNC: 111.2 MG/DL
HBA1C MFR BLD: 5.5 %

## 2023-05-09 ENCOUNTER — OFFICE VISIT (OUTPATIENT)
Dept: FAMILY MEDICINE CLINIC | Age: 63
End: 2023-05-09
Payer: MEDICAID

## 2023-05-09 VITALS
BODY MASS INDEX: 29.35 KG/M2 | WEIGHT: 187 LBS | RESPIRATION RATE: 18 BRPM | SYSTOLIC BLOOD PRESSURE: 136 MMHG | HEIGHT: 67 IN | DIASTOLIC BLOOD PRESSURE: 86 MMHG | HEART RATE: 59 BPM | OXYGEN SATURATION: 98 % | TEMPERATURE: 98.1 F

## 2023-05-09 DIAGNOSIS — M54.31 SCIATICA OF RIGHT SIDE: ICD-10-CM

## 2023-05-09 DIAGNOSIS — L91.8 MULTIPLE ACQUIRED SKIN TAGS: Primary | ICD-10-CM

## 2023-05-09 PROCEDURE — 99214 OFFICE O/P EST MOD 30 MIN: CPT | Performed by: NURSE PRACTITIONER

## 2023-05-09 PROCEDURE — 3075F SYST BP GE 130 - 139MM HG: CPT | Performed by: NURSE PRACTITIONER

## 2023-05-09 PROCEDURE — 3079F DIAST BP 80-89 MM HG: CPT | Performed by: NURSE PRACTITIONER

## 2023-05-09 RX ORDER — IBUPROFEN 800 MG/1
800 TABLET ORAL
Qty: 90 TABLET | Refills: 0 | Status: SHIPPED | OUTPATIENT
Start: 2023-05-09

## 2023-05-09 ASSESSMENT — ENCOUNTER SYMPTOMS
CHEST TIGHTNESS: 0
COUGH: 0
SHORTNESS OF BREATH: 0
WHEEZING: 0

## 2023-05-09 ASSESSMENT — PATIENT HEALTH QUESTIONNAIRE - PHQ9
SUM OF ALL RESPONSES TO PHQ QUESTIONS 1-9: 0
SUM OF ALL RESPONSES TO PHQ QUESTIONS 1-9: 0
SUM OF ALL RESPONSES TO PHQ9 QUESTIONS 1 & 2: 0
SUM OF ALL RESPONSES TO PHQ QUESTIONS 1-9: 0
2. FEELING DOWN, DEPRESSED OR HOPELESS: 0
1. LITTLE INTEREST OR PLEASURE IN DOING THINGS: 0
SUM OF ALL RESPONSES TO PHQ QUESTIONS 1-9: 0

## 2023-05-09 NOTE — PATIENT INSTRUCTIONS
[facility] - Thea Gonzalez MD  [address]   [address], [address]   Tel: [phone]

## 2023-05-09 NOTE — PROGRESS NOTES
2023     Wendy Royal (:  1960) is a 63 y.o. female, here for evaluation of the following medical concerns:    Chief Complaint   Patient presents with    Skin Problem     Neck itchy when hot     HPI  Rash:  Patient has many skin tags around neck, chest and upper back she would like to have removed. She has had them lasered off in the past and more have grown. She would like to see a dermatologist for this. Onset many years ago. Sciatica:  still having intermittent pain on right with some radiculopathy. States this depends on what she is doing for the day. She had been taking ibuprofen 600mg PRN but this is now working as well as the Ibuprofen 800mg. Denies new injury, numbness/tingling or loss of bowel/bladder control. Review of Systems   Constitutional:  Negative for chills, diaphoresis, fatigue and fever. Respiratory:  Negative for cough, chest tightness, shortness of breath and wheezing. Cardiovascular:  Negative for chest pain and palpitations. Skin:  Positive for rash. Neurological:  Negative for dizziness, weakness and headaches. Prior to Visit Medications    Medication Sig Taking?  Authorizing Provider   ibuprofen (ADVIL;MOTRIN) 800 MG tablet Take 1 tablet by mouth 3 times daily (with meals) Yes SARAH Gaines CNP   hydroCHLOROthiazide (HYDRODIURIL) 25 MG tablet Take 1 tablet by mouth every morning Yes SARAH Gaines CNP   atorvastatin (LIPITOR) 80 MG tablet Take 1 tablet by mouth daily Yes SARAH Gaines CNP        Social History     Tobacco Use    Smoking status: Never    Smokeless tobacco: Never   Vaping Use    Vaping Use: Unknown   Substance Use Topics    Alcohol use: No    Drug use: Never        Vitals:    23 1037 23 1143   BP: (!) 140/86 136/86   Site: Left Upper Arm Left Upper Arm   Position: Sitting Sitting   Cuff Size: Large Adult Medium Adult   Pulse: 59    Resp: 18    Temp: 98.1 °F (36.7 °C)    TempSrc: Oral    SpO2: 98%

## 2023-11-04 ENCOUNTER — HOSPITAL ENCOUNTER (EMERGENCY)
Age: 63
Discharge: HOME | End: 2023-11-04
Attending: INTERNAL MEDICINE
Payer: COMMERCIAL

## 2023-11-04 VITALS
RESPIRATION RATE: 17 BRPM | TEMPERATURE: 98.2 F | SYSTOLIC BLOOD PRESSURE: 158 MMHG | OXYGEN SATURATION: 98 % | HEART RATE: 56 BPM | DIASTOLIC BLOOD PRESSURE: 95 MMHG

## 2023-11-04 DIAGNOSIS — R03.0 ELEVATED BLOOD PRESSURE READING: Primary | ICD-10-CM

## 2023-11-04 DIAGNOSIS — R53.1 GENERAL WEAKNESS: ICD-10-CM

## 2023-11-04 LAB
ANION GAP SERPL CALCULATED.3IONS-SCNC: 11 MMOL/L (ref 3–16)
BASOPHILS # BLD: 0.1 K/UL (ref 0–0.2)
BASOPHILS NFR BLD: 1.3 %
BILIRUB UR QL STRIP.AUTO: NEGATIVE
BUN SERPL-MCNC: 17 MG/DL (ref 7–20)
CALCIUM SERPL-MCNC: 9.3 MG/DL (ref 8.3–10.6)
CHLORIDE SERPL-SCNC: 104 MMOL/L (ref 99–110)
CLARITY UR: CLEAR
CO2 SERPL-SCNC: 25 MMOL/L (ref 21–32)
COLOR UR: YELLOW
CREAT SERPL-MCNC: 0.8 MG/DL (ref 0.6–1.2)
DEPRECATED RDW RBC AUTO: 13.3 % (ref 12.4–15.4)
EKG ATRIAL RATE: 58 BPM
EKG DIAGNOSIS: NORMAL
EKG P AXIS: 77 DEGREES
EKG P-R INTERVAL: 158 MS
EKG Q-T INTERVAL: 450 MS
EKG QRS DURATION: 74 MS
EKG QTC CALCULATION (BAZETT): 441 MS
EKG R AXIS: 30 DEGREES
EKG T AXIS: 83 DEGREES
EKG VENTRICULAR RATE: 58 BPM
EOSINOPHIL # BLD: 0.1 K/UL (ref 0–0.6)
EOSINOPHIL NFR BLD: 1.3 %
GFR SERPLBLD CREATININE-BSD FMLA CKD-EPI: >60 ML/MIN/{1.73_M2}
GLUCOSE SERPL-MCNC: 102 MG/DL (ref 70–99)
GLUCOSE UR STRIP.AUTO-MCNC: NEGATIVE MG/DL
HCT VFR BLD AUTO: 36.6 % (ref 36–48)
HGB BLD-MCNC: 11.9 G/DL (ref 12–16)
HGB UR QL STRIP.AUTO: NEGATIVE
KETONES UR STRIP.AUTO-MCNC: NEGATIVE MG/DL
LEUKOCYTE ESTERASE UR QL STRIP.AUTO: NEGATIVE
LYMPHOCYTES # BLD: 2.7 K/UL (ref 1–5.1)
LYMPHOCYTES NFR BLD: 47.5 %
MCH RBC QN AUTO: 30.4 PG (ref 26–34)
MCHC RBC AUTO-ENTMCNC: 32.6 G/DL (ref 31–36)
MCV RBC AUTO: 93.4 FL (ref 80–100)
MONOCYTES # BLD: 0.6 K/UL (ref 0–1.3)
MONOCYTES NFR BLD: 10.3 %
NEUTROPHILS # BLD: 2.3 K/UL (ref 1.7–7.7)
NEUTROPHILS NFR BLD: 39.6 %
NITRITE UR QL STRIP.AUTO: NEGATIVE
PH UR STRIP.AUTO: 6 [PH] (ref 5–8)
PLATELET # BLD AUTO: 315 K/UL (ref 135–450)
PMV BLD AUTO: 8.3 FL (ref 5–10.5)
POTASSIUM SERPL-SCNC: 4.2 MMOL/L (ref 3.5–5.1)
PROT UR STRIP.AUTO-MCNC: NEGATIVE MG/DL
RBC # BLD AUTO: 3.91 M/UL (ref 4–5.2)
SODIUM SERPL-SCNC: 140 MMOL/L (ref 136–145)
SP GR UR STRIP.AUTO: 1.02 (ref 1–1.03)
UA COMPLETE W REFLEX CULTURE PNL UR: NORMAL
UA DIPSTICK W REFLEX MICRO PNL UR: NORMAL
URN SPEC COLLECT METH UR: NORMAL
UROBILINOGEN UR STRIP-ACNC: 0.2 E.U./DL
WBC # BLD AUTO: 5.8 K/UL (ref 4–11)

## 2023-11-04 PROCEDURE — 81003 URINALYSIS AUTO W/O SCOPE: CPT

## 2023-11-04 PROCEDURE — 6370000000 HC RX 637 (ALT 250 FOR IP): Performed by: INTERNAL MEDICINE

## 2023-11-04 PROCEDURE — 80048 BASIC METABOLIC PNL TOTAL CA: CPT

## 2023-11-04 PROCEDURE — 93005 ELECTROCARDIOGRAM TRACING: CPT | Performed by: INTERNAL MEDICINE

## 2023-11-04 PROCEDURE — 99284 EMERGENCY DEPT VISIT MOD MDM: CPT

## 2023-11-04 PROCEDURE — 85025 COMPLETE CBC W/AUTO DIFF WBC: CPT

## 2023-11-04 RX ORDER — HYDROCHLOROTHIAZIDE 25 MG/1
25 TABLET ORAL ONCE
Status: COMPLETED | OUTPATIENT
Start: 2023-11-04 | End: 2023-11-04

## 2023-11-04 RX ORDER — HYDROCHLOROTHIAZIDE 25 MG/1
25 TABLET ORAL DAILY
Qty: 30 TABLET | Refills: 0 | Status: SHIPPED | OUTPATIENT
Start: 2023-11-04

## 2023-11-04 RX ADMIN — HYDROCHLOROTHIAZIDE 25 MG: 25 TABLET ORAL at 05:31

## 2023-11-04 ASSESSMENT — LIFESTYLE VARIABLES
HOW OFTEN DO YOU HAVE A DRINK CONTAINING ALCOHOL: NEVER
HOW MANY STANDARD DRINKS CONTAINING ALCOHOL DO YOU HAVE ON A TYPICAL DAY: PATIENT DOES NOT DRINK

## 2023-11-04 NOTE — CONSULTS
Session ID: [id number]  Language: [address] [place] [address] ID: #[id number]   Name: Maurice Hernandez

## 2023-11-04 NOTE — ED PROVIDER NOTES
EMERGENCY MEDICINE PROVIDER NOTE    Patient Identification  Pt Name: Rosie Panda  MRN: [id number]  [address] [facility] [place] 1960  Date of evaluation: 11/4/2023  Provider: Jerry Harrison DO  PCP: SARAH Gutierrez - CNP    Chief Complaint  Hypertension (Pt arrives in the ED with c/o hypertension and weakness./Pt bp in triage is 182/98 )      HPI  (History provided by patient)  This is a 63 y.o. female who was brought in by self for hypertension and generalized weakness. Patient denies any chest pain or shortness of breath. Patient states that for the past 3 days she has been quite weak. Patient denies any focal weakness. Patient denies hematuria and dysuria. She does have some low back pain and her  who is at bedside states that it is probably due to her driving a lot. She is able to talk to me and does not need a  according to the patient. Patient states that she used to be on hydrochlorothiazide but stopped taking it because her blood pressure was normal.    I have reviewed the following nursing documentation:  Allergies: Patient has no known allergies. Past medical history:   Past Medical History:   Diagnosis Date    Cervical cancer screening 2016    Nml per pt'. HTN (hypertension) 02/09/2022    Hypercholesteremia 02/11/2022    lifetime ASCVD risk 39%     Past surgical history: History reviewed. No pertinent surgical history. Home medications:   Discharge Medication List as of 11/4/2023  6:04 AM        CONTINUE these medications which have NOT CHANGED    Details   ibuprofen (ADVIL;MOTRIN) 800 MG tablet Take 1 tablet by mouth 3 times daily (with meals), Disp-90 tablet, R-0Normal      !! hydroCHLOROthiazide (HYDRODIURIL) 25 MG tablet Take 1 tablet by mouth every morning, Disp-90 tablet, R-1Normal      atorvastatin (LIPITOR) 80 MG tablet Take 1 tablet by mouth daily, Disp-90 tablet, R-1Normal       !! - Potential duplicate medications found. Please discuss with provider.           Social

## 2023-11-04 NOTE — ED NOTES
Discharge instructions given, patient acknowledged understanding, patient ambulated out of ed upon discharge      Clara Newell, [address]  11/04/23 6081

## 2023-11-13 DIAGNOSIS — M54.31 SCIATICA OF RIGHT SIDE: ICD-10-CM

## 2023-11-13 RX ORDER — IBUPROFEN 800 MG/1
TABLET ORAL
Qty: 90 TABLET | Refills: 0 | Status: SHIPPED | OUTPATIENT
Start: 2023-11-13

## 2023-11-13 NOTE — TELEPHONE ENCOUNTER
Medication:   Requested Prescriptions     Pending Prescriptions Disp Refills    ibuprofen (ADVIL;MOTRIN) 800 MG tablet [Pharmacy Med Name: IBUPROFEN 800 MG TABLET] 90 tablet 0     Sig: TAKE ONE TABLET BY MOUTH THREE TIMES A DAY WITH MEALS        Last Filled:  5/9/2023    Patient Phone Number: [phone] (home)     Last appt: 5/9/2023   Next appt: Visit date not found    Last OARRS:        No data to display

## 2023-12-13 ENCOUNTER — OFFICE VISIT (OUTPATIENT)
Dept: FAMILY MEDICINE CLINIC | Age: 63
End: 2023-12-13
Payer: COMMERCIAL

## 2023-12-13 VITALS
WEIGHT: 192.2 LBS | DIASTOLIC BLOOD PRESSURE: 88 MMHG | SYSTOLIC BLOOD PRESSURE: 138 MMHG | RESPIRATION RATE: 16 BRPM | HEART RATE: 62 BPM | HEIGHT: 67 IN | BODY MASS INDEX: 30.17 KG/M2 | OXYGEN SATURATION: 99 %

## 2023-12-13 DIAGNOSIS — I10 PRIMARY HYPERTENSION: ICD-10-CM

## 2023-12-13 DIAGNOSIS — Z12.11 COLON CANCER SCREENING: ICD-10-CM

## 2023-12-13 DIAGNOSIS — M54.50 LUMBAR PAIN: Primary | ICD-10-CM

## 2023-12-13 DIAGNOSIS — L91.8 SKIN TAG: ICD-10-CM

## 2023-12-13 PROCEDURE — 3075F SYST BP GE 130 - 139MM HG: CPT | Performed by: NURSE PRACTITIONER

## 2023-12-13 PROCEDURE — 99214 OFFICE O/P EST MOD 30 MIN: CPT | Performed by: NURSE PRACTITIONER

## 2023-12-13 PROCEDURE — 3079F DIAST BP 80-89 MM HG: CPT | Performed by: NURSE PRACTITIONER

## 2023-12-13 RX ORDER — LOSARTAN POTASSIUM 50 MG/1
50 TABLET ORAL DAILY
Qty: 90 TABLET | Refills: 1 | Status: SHIPPED | OUTPATIENT
Start: 2023-12-13

## 2023-12-13 ASSESSMENT — PATIENT HEALTH QUESTIONNAIRE - PHQ9
SUM OF ALL RESPONSES TO PHQ QUESTIONS 1-9: 0
1. LITTLE INTEREST OR PLEASURE IN DOING THINGS: 0
2. FEELING DOWN, DEPRESSED OR HOPELESS: 0
SUM OF ALL RESPONSES TO PHQ9 QUESTIONS 1 & 2: 0
SUM OF ALL RESPONSES TO PHQ QUESTIONS 1-9: 0

## 2023-12-13 ASSESSMENT — ENCOUNTER SYMPTOMS
GASTROINTESTINAL NEGATIVE: 1
COUGH: 0
BACK PAIN: 1
WHEEZING: 0
SHORTNESS OF BREATH: 0
CHEST TIGHTNESS: 0

## 2023-12-13 NOTE — PROGRESS NOTES
heard.  Pulmonary:      Effort: Pulmonary effort is normal.      Breath sounds: Normal breath sounds and air entry. Musculoskeletal:      Lumbar back: Tenderness present. No swelling, edema, deformity, signs of trauma, lacerations, spasms or bony tenderness. Decreased range of motion. Negative right straight leg raise test and negative left straight leg raise test. No scoliosis. Skin:     General: Skin is warm and dry. Capillary Refill: Capillary refill takes less than 2 seconds. Neurological:      General: No focal deficit present. Mental Status: She is alert. Psychiatric:         Mood and Affect: Mood normal.         Behavior: Behavior is cooperative. ASSESSMENT/PLAN:  1. Primary hypertension  Stable  Start Losartan 50mg daily  STOP HCTZ due to side effects  DASH diet  Recommended at least 30 minutes of aerobic exercise daily. - losartan (COZAAR) 50 MG tablet; Take 1 tablet by mouth daily  Dispense: 90 tablet; Refill: 1    2. Lumbar pain  - Doroat Orozco, [address] [place] [address], Orthopedic Surgery (Spine), [facility]    3. Skin tag  - External Referral To Dermatology    4. Colon cancer screening  Will call to schedule  - COLONOSCOPY (Screening); Future  - [facility] - Claudette Emmanuel MD, Gastroenterology, [address]    Return in about 3 months (around 3/13/2024), or if symptoms worsen or fail to improve.

## 2023-12-13 NOTE — PATIENT INSTRUCTIONS
Call to schedule with the back specialist  [address] [facility], PA-C  [address], [address]  [facility], [address] [facility]  Phone: [phone]    Call to schedule your colonoscopy  [facility], [address], [address], [address]  Phone: [phone]    Call to schedule to discuss options for skin tag removal  Juan José Fernandes, [facility] [address]  Dermatology  [address] , [address] [address] [address]  [phone]

## 2024-01-09 ENCOUNTER — OFFICE VISIT (OUTPATIENT)
Dept: ORTHOPEDIC SURGERY | Age: 64
End: 2024-01-09
Payer: MEDICAID

## 2024-01-09 VITALS — WEIGHT: 187.2 LBS | BODY MASS INDEX: 29.32 KG/M2

## 2024-01-09 DIAGNOSIS — M48.061 DEGENERATIVE LUMBAR SPINAL STENOSIS: Primary | ICD-10-CM

## 2024-01-09 DIAGNOSIS — M47.816 LUMBAR SPONDYLOSIS: ICD-10-CM

## 2024-01-09 DIAGNOSIS — M51.36 DDD (DEGENERATIVE DISC DISEASE), LUMBAR: ICD-10-CM

## 2024-01-09 DIAGNOSIS — M54.50 LOW BACK PAIN, UNSPECIFIED BACK PAIN LATERALITY, UNSPECIFIED CHRONICITY, UNSPECIFIED WHETHER SCIATICA PRESENT: ICD-10-CM

## 2024-01-09 PROCEDURE — 99204 OFFICE O/P NEW MOD 45 MIN: CPT | Performed by: INTERNAL MEDICINE

## 2024-01-09 RX ORDER — METHYLPREDNISOLONE 4 MG/1
TABLET ORAL
Qty: 1 KIT | Refills: 0 | Status: SHIPPED | OUTPATIENT
Start: 2024-01-09

## 2024-01-09 RX ORDER — TIZANIDINE 4 MG/1
4 TABLET ORAL 3 TIMES DAILY PRN
Qty: 30 TABLET | Refills: 1 | Status: SHIPPED | OUTPATIENT
Start: 2024-01-09

## 2024-01-09 RX ORDER — MELOXICAM 15 MG/1
15 TABLET ORAL DAILY PRN
Qty: 30 TABLET | Refills: 2 | Status: SHIPPED | OUTPATIENT
Start: 2024-01-09

## 2024-01-09 RX ORDER — TRAMADOL HYDROCHLORIDE 50 MG/1
50 TABLET ORAL EVERY 6 HOURS PRN
Qty: 20 TABLET | Refills: 0 | Status: SHIPPED | OUTPATIENT
Start: 2024-01-09 | End: 2024-01-14

## 2024-01-09 NOTE — PROGRESS NOTES
Chief Complaint:   Chief Complaint   Patient presents with    Lower Back Pain     NP LUMBAR. Very painful even when sitting. Going on for about 3-5  months. No known injury. Taking ibuprofen but doesn't help much.          History of Present Illness:       Patient is a 63 y.o. female presents with the above complaint. The symptoms began 5 monthsago and started without an injury. The patient describes a burning, throbbing pain that does radiate into the buttocks.  The symptoms are constant  and are are improving since the onset.        The symptoms of back pain  do show a neurogenic claudication pattern and is worsened by standing and walking and improved with sitting. There is not new onset weakness or progressive weakness of the lower extremities that has developed. The patient denies new onset bowel or bladder dysfunction.  There is no history of previous spinal trauma.    The patient does not have history or orthopaedic lumbar spine surgery.    Pain localizes to the lumbar region    Pain levels: 9     There is no  lower limb pain.     Work-up to date has included: NONE  Prior treatment has included NSAID-Motrin, muscle relaxant-NOS. This patient reports some improvement with this treatment.    The patient has no history or autoimmune disease, inflammatory arthropathy or crystal arthropathy.      Past Medical History:        Past Medical History:   Diagnosis Date    Cervical cancer screening 2016    Nml per pt'.    HTN (hypertension) 02/09/2022    Hypercholesteremia 02/11/2022    lifetime ASCVD risk 39%       No past surgical history on file.      Present Medications:         Current Outpatient Medications   Medication Sig Dispense Refill    methylPREDNISolone (MEDROL, ELIZABETH,) 4 MG tablet By mouth. 1 kit 0    meloxicam (MOBIC) 15 MG tablet Take 1 tablet by mouth daily as needed for Pain Start after completing Medrol 30 tablet 2    tiZANidine (ZANAFLEX) 4 MG tablet Take 1 tablet by mouth 3 times daily as needed

## 2024-02-20 ENCOUNTER — OFFICE VISIT (OUTPATIENT)
Dept: ORTHOPEDIC SURGERY | Age: 64
End: 2024-02-20
Payer: MEDICAID

## 2024-02-20 VITALS — BODY MASS INDEX: 30.13 KG/M2 | HEIGHT: 67 IN | WEIGHT: 192 LBS

## 2024-02-20 DIAGNOSIS — M54.50 LOW BACK PAIN, UNSPECIFIED BACK PAIN LATERALITY, UNSPECIFIED CHRONICITY, UNSPECIFIED WHETHER SCIATICA PRESENT: ICD-10-CM

## 2024-02-20 DIAGNOSIS — M48.062 SPINAL STENOSIS OF LUMBAR REGION WITH NEUROGENIC CLAUDICATION: ICD-10-CM

## 2024-02-20 DIAGNOSIS — M48.061 DEGENERATIVE LUMBAR SPINAL STENOSIS: Primary | ICD-10-CM

## 2024-02-20 DIAGNOSIS — M47.816 LUMBAR SPONDYLOSIS: ICD-10-CM

## 2024-02-20 DIAGNOSIS — M51.36 DDD (DEGENERATIVE DISC DISEASE), LUMBAR: ICD-10-CM

## 2024-02-20 PROCEDURE — 99214 OFFICE O/P EST MOD 30 MIN: CPT | Performed by: INTERNAL MEDICINE

## 2024-02-20 RX ORDER — TIZANIDINE 4 MG/1
4 TABLET ORAL 3 TIMES DAILY PRN
Qty: 40 TABLET | Refills: 1 | Status: SHIPPED | OUTPATIENT
Start: 2024-02-20

## 2024-02-20 RX ORDER — MELOXICAM 15 MG/1
15 TABLET ORAL DAILY PRN
Qty: 30 TABLET | Refills: 2 | Status: SHIPPED | OUTPATIENT
Start: 2024-02-20

## 2024-02-20 NOTE — PROGRESS NOTES
Chief Complaint:   Chief Complaint   Patient presents with    Lower Back Pain     Lumber - MRI results. Home exercises aren't helping. 50% relief for 2 weeks with dose kishore.          History of Present Illness:       Patient is a 63 y.o. female returns follow up for the above complaint. The patient was last seen approximately 5 weeksago. The symptoms are worsening since the last visit. The patient has had further testing for the problem.  MRI completed in the interim:    Therapeutic benefit from trial of steroids    Conclusions: Proscan imaging 1/31/2024 #1 L4/L5 high-grade spinal stenosis.  Large disc bulge trace retrolisthesis disc impinges upon dural sac L5 and foraminal L4 nerve roots.  Posterior element hypertrophy including ligamentum flavum hypertrophy.  High-grade biforaminal stenosis.  #2 L5/S1 moderate spinal stenosis.  Moderate disc bulge and trace anterolisthesis contacts S1 and foraminal L5 nerve roots.  Posterior element hypertrophy.  L3/L4 moderate spinal stenosis.  Moderate bilobed disc protrusion extends by foraminally; impinges upon L4 and foraminal L3 nerve roots.  Posterior element hypertrophy.  Moderate biforaminal stenosis.  #4 L2/L3 moderate spinal stenosis moderate central and bilateral subarticular extrusion slightly greater on the left with left greater than right foraminal extension impinges upon left greater than right L3 and foraminal L2 nerve roots.    Back:Bilateral leg pain 85: 15. Pain in back and legs is aching or heaviness and numbness in quality.  Lower limb symptoms following L5/S1 dermatomal distribution radiating below the knees    Pain levels 0-:8.  Symptoms follow a neurogenic claudication pattern    The patient denies new onset or progressive weakness of the lower extremities.  The patient denies new onset bowel or bladder dysfunction.    She continues on medical pain management as per previous inclusive of NSAID-meloxicam , muscle relaxant-Zanaflex     Past Medical

## 2024-02-28 ENCOUNTER — HOSPITAL ENCOUNTER (OUTPATIENT)
Dept: PHYSICAL THERAPY | Age: 64
Setting detail: THERAPIES SERIES
Discharge: HOME | End: 2024-02-28
Payer: MEDICAID

## 2024-02-28 DIAGNOSIS — M54.41 BILATERAL LOW BACK PAIN WITH BILATERAL SCIATICA, UNSPECIFIED CHRONICITY: Primary | ICD-10-CM

## 2024-02-28 DIAGNOSIS — M54.42 BILATERAL LOW BACK PAIN WITH BILATERAL SCIATICA, UNSPECIFIED CHRONICITY: Primary | ICD-10-CM

## 2024-02-28 PROCEDURE — 97162 PT EVAL MOD COMPLEX 30 MIN: CPT

## 2024-02-28 PROCEDURE — 97530 THERAPEUTIC ACTIVITIES: CPT

## 2024-02-28 PROCEDURE — 97140 MANUAL THERAPY 1/> REGIONS: CPT

## 2024-02-28 NOTE — PLAN OF CARE
[facility] - Outpatient Rehabilitation and Therapy [address], [place] [address] office: [phone] fax: [phone]     Physical Therapy Initial Evaluation Certification      Dear Dileep Madden*,    We had the pleasure of evaluating the following patient for physical therapy services at [facility].  A summary of our findings can be found in the initial assessment below.  This includes our plan of care.  If you have any questions or concerns regarding these findings, please do not hesitate to contact me at the office phone number listed above.  Thank you for the referral.     Physician Signature:_______________________________Date:__________________  By signing above (or electronic signature), therapist’s plan is approved by physician       Physical Therapy: TREATMENT/PROGRESS NOTE   Patient: Dalton Yo (63 y.o. female)   Examination Date: 2024   :  1960 MRN: [id number]   Visit #: 1   Insurance Allowable Auth Needed   30 []Yes    [x]No    Insurance: Payor[place] / [place] / Product Type: *No Product type* /   Insurance ID: [id number] - (Medicaid Managed)  Secondary Insurance (if applicable):    Treatment Diagnosis:     ICD-10-CM    1. Bilateral low back pain with bilateral sciatica, unspecified chronicity  M54.42     M54.41          Medical Diagnosis:  Degenerative lumbar spinal stenosis [M48.061]  DDD (degenerative disc disease), lumbar [M51.36]  Lumbar spondylosis [M47.816]  Low back pain, unspecified back pain laterality, unspecified chronicity, unspecified whether sciatica present [M54.50]   Referring Physician: Dileep Madden*  PCP: Shaylee Valentin APRN - CNP       Plan of care signed (Y/N):     Date of Patient follow up with Physician:      Progress Report/POC: EVAL today  POC update due: (10 visits /OR AUTH LIMITS, whichever is less)  3/29/2024

## 2024-03-04 ENCOUNTER — ANESTHESIA EVENT (OUTPATIENT)
Dept: ENDOSCOPY | Age: 64
End: 2024-03-04
Payer: MEDICAID

## 2024-03-04 ENCOUNTER — HOSPITAL ENCOUNTER (OUTPATIENT)
Age: 64
Setting detail: OUTPATIENT SURGERY
Discharge: HOME | End: 2024-03-04
Attending: INTERNAL MEDICINE | Admitting: INTERNAL MEDICINE
Payer: MEDICAID

## 2024-03-04 ENCOUNTER — ANESTHESIA (OUTPATIENT)
Dept: ENDOSCOPY | Age: 64
End: 2024-03-04
Payer: MEDICAID

## 2024-03-04 VITALS
TEMPERATURE: 97.3 F | SYSTOLIC BLOOD PRESSURE: 155 MMHG | HEIGHT: 67 IN | HEART RATE: 80 BPM | DIASTOLIC BLOOD PRESSURE: 89 MMHG | WEIGHT: 190 LBS | OXYGEN SATURATION: 97 % | RESPIRATION RATE: 16 BRPM | BODY MASS INDEX: 29.82 KG/M2

## 2024-03-04 PROCEDURE — 3609027000 HC COLONOSCOPY: Performed by: INTERNAL MEDICINE

## 2024-03-04 PROCEDURE — 3700000000 HC ANESTHESIA ATTENDED CARE: Performed by: INTERNAL MEDICINE

## 2024-03-04 PROCEDURE — 3700000001 HC ADD 15 MINUTES (ANESTHESIA): Performed by: INTERNAL MEDICINE

## 2024-03-04 PROCEDURE — 7100000010 HC PHASE II RECOVERY - FIRST 15 MIN: Performed by: INTERNAL MEDICINE

## 2024-03-04 PROCEDURE — 2580000003 HC RX 258: Performed by: INTERNAL MEDICINE

## 2024-03-04 PROCEDURE — 7100000011 HC PHASE II RECOVERY - ADDTL 15 MIN: Performed by: INTERNAL MEDICINE

## 2024-03-04 PROCEDURE — 2709999900 HC NON-CHARGEABLE SUPPLY: Performed by: INTERNAL MEDICINE

## 2024-03-04 PROCEDURE — 6360000002 HC RX W HCPCS: Performed by: NURSE ANESTHETIST, CERTIFIED REGISTERED

## 2024-03-04 RX ORDER — PROPOFOL 10 MG/ML
INJECTION, EMULSION INTRAVENOUS PRN
Status: DISCONTINUED | OUTPATIENT
Start: 2024-03-04 | End: 2024-03-04 | Stop reason: SDUPTHER

## 2024-03-04 RX ORDER — SODIUM CHLORIDE 9 MG/ML
INJECTION, SOLUTION INTRAVENOUS CONTINUOUS
Status: DISCONTINUED | OUTPATIENT
Start: 2024-03-04 | End: 2024-03-04 | Stop reason: HOSPADM

## 2024-03-04 RX ORDER — METHYLPREDNISOLONE 4 MG/1
4 TABLET ORAL DAILY
COMMUNITY

## 2024-03-04 RX ADMIN — PROPOFOL 30 MG: 10 INJECTION, EMULSION INTRAVENOUS at 11:58

## 2024-03-04 RX ADMIN — PROPOFOL 150 MCG/KG/MIN: 10 INJECTION, EMULSION INTRAVENOUS at 11:57

## 2024-03-04 RX ADMIN — SODIUM CHLORIDE: 9 INJECTION, SOLUTION INTRAVENOUS at 10:32

## 2024-03-04 RX ADMIN — PROPOFOL 70 MG: 10 INJECTION, EMULSION INTRAVENOUS at 11:56

## 2024-03-04 ASSESSMENT — ENCOUNTER SYMPTOMS: SHORTNESS OF BREATH: 0

## 2024-03-04 ASSESSMENT — PAIN - FUNCTIONAL ASSESSMENT: PAIN_FUNCTIONAL_ASSESSMENT: 0-10

## 2024-03-04 NOTE — DISCHARGE INSTRUCTIONS

## 2024-03-04 NOTE — H&P
Gastroenterology Note                 Pre-operative History and Physical    Patient: Dalton Yo  : 1960  CSN:     History Obtained From:   Patient or guardian.      HISTORY OF PRESENT ILLNESS:    The patient is a 63 y.o. female here for Endoscopy.      Past Medical History:    Past Medical History:   Diagnosis Date    Cervical cancer screening 2016    Nml per pt'.    Chronic back pain     HTN (hypertension) 2022    Hypercholesteremia 2022    lifetime ASCVD risk 39%     Past Surgical History:    Past Surgical History:   Procedure Laterality Date    APPENDECTOMY       Medications Prior to Admission:   No current facility-administered medications on file prior to encounter.     Current Outpatient Medications on File Prior to Encounter   Medication Sig Dispense Refill    methylPREDNISolone (MEDROL) 4 MG tablet Take 1 tablet by mouth daily      tiZANidine (ZANAFLEX) 4 MG tablet Take 1 tablet by mouth 3 times daily as needed (Muscle tightness/spasm) 40 tablet 1    meloxicam (MOBIC) 15 MG tablet Take 1 tablet by mouth daily as needed for Pain Start after completing Medrol 30 tablet 2    losartan (COZAAR) 50 MG tablet Take 1 tablet by mouth daily 90 tablet 1    hydroCHLOROthiazide (HYDRODIURIL) 25 MG tablet Take 1 tablet by mouth every morning 90 tablet 1    atorvastatin (LIPITOR) 80 MG tablet Take 1 tablet by mouth daily (Patient not taking: Reported on 2023) 90 tablet 1        Allergies:  Patient has no known allergies.      Social History:   Social History     Tobacco Use    Smoking status: Never    Smokeless tobacco: Never   Substance Use Topics    Alcohol use: No     Family History:   History reviewed. No pertinent family history.    PHYSICAL EXAM:      BP (!) 174/80   Pulse 60   Temp 98.3 °F (36.8 °C) (Temporal)   Resp 16   Ht 1.702 m (5' 7\")   Wt 86.2 kg (190 lb)   SpO2 99%   BMI 29.76 kg/m²  I        Heart:  RRR, normal s1s2    Lungs:  CTA and normal

## 2024-03-04 NOTE — ANESTHESIA PRE PROCEDURE
Department of Anesthesiology  Preprocedure Note       Name:  Dalton Yo   Age:  63 y.o.  :  1960                                          MRN:  [id number]         Date:  3/4/2024      Surgeon: Surgeon(s):  Ranulfo De La Torre MD    Procedure: Procedure(s):  COLONOSCOPY DIAGNOSTIC    Medications prior to admission:   Prior to Admission medications    Medication Sig Start Date End Date Taking? Authorizing Provider   methylPREDNISolone (MEDROL) 4 MG tablet Take 1 tablet by mouth daily   Yes Provider, MD Rita   tiZANidine (ZANAFLEX) 4 MG tablet Take 1 tablet by mouth 3 times daily as needed (Muscle tightness/spasm) 24   Dileep Madden MD   meloxicam (MOBIC) 15 MG tablet Take 1 tablet by mouth daily as needed for Pain Start after completing Medrol 24   Dileep Madden MD   losartan (COZAAR) 50 MG tablet Take 1 tablet by mouth daily 23   Shaylee Valentin APRN - CNP   losartan (COZAAR) 50 MG tablet Take 1 tablet by mouth daily 23   Shaylee Valentin APRN - CNP   hydroCHLOROthiazide (HYDRODIURIL) 25 MG tablet Take 1 tablet by mouth daily  Patient not taking: Reported on 2023   Paul Choi DO   hydroCHLOROthiazide (HYDRODIURIL) 25 MG tablet Take 1 tablet by mouth every morning 3/23/23   Shaylee Valentin APRN - CNP   atorvastatin (LIPITOR) 80 MG tablet Take 1 tablet by mouth daily  Patient not taking: Reported on 2023   Shaylee Valentin APRN - CNP       Current medications:    Current Facility-Administered Medications   Medication Dose Route Frequency Provider Last Rate Last Admin    0.9 % sodium chloride infusion   IntraVENous Continuous Ranulfo De La Torre MD           Allergies:  No Known Allergies    Problem List:    Patient Active Problem List   Diagnosis Code    Sciatica of right side M54.31    HTN (hypertension) I10    Hypercholesteremia E78.00    Grief at loss of child F43.21, Z63.4       Past Medical History:        Diagnosis Date

## 2024-03-04 NOTE — ANESTHESIA POSTPROCEDURE EVALUATION
Department of Anesthesiology  Postprocedure Note    Patient: Dalton Yo  MRN: [id number]  YOB: 1960  Date of evaluation: 3/4/2024    Procedure Summary       Date: 03/04/24 Room / Location: [place] [address] / [facility]    Anesthesia Start: 1152 Anesthesia Stop: 1206    Procedure: COLONOSCOPY DIAGNOSTIC Diagnosis:       Colon cancer screening      (Colon cancer screening [Z12.11])    Surgeons: Ranulfo De La Torre MD Responsible Provider: Addison Green MD    Anesthesia Type: MAC ASA Status: 2            Anesthesia Type: No value filed.    Esteban Phase I: Esteban Score: 10    Esteban Phase II:      Anesthesia Post Evaluation    Patient location during evaluation: PACU  Patient participation: complete - patient participated  Level of consciousness: awake  Airway patency: patent  Nausea & Vomiting: no nausea and no vomiting  Cardiovascular status: hemodynamically stable  Respiratory status: acceptable  Hydration status: stable  Multimodal analgesia pain management approach  Pain management: adequate    No notable events documented.

## 2024-03-04 NOTE — PROGRESS NOTES
Pt arrived from procedure room to recovery bay 7  . Report received from endo staff and CRNA. Pt is arousable to voice. Pt on RA,  and VSS. pt meets criteria for Phase II. Will continue with care plan.

## 2024-03-04 NOTE — PROGRESS NOTES
Patient education given on colonoscopy and the patient expresses understanding and acceptance of instructions. Eugenie Gale RN 3/4/2024 10:15 AM

## 2024-03-06 ENCOUNTER — HOSPITAL ENCOUNTER (OUTPATIENT)
Dept: PHYSICAL THERAPY | Age: 64
Setting detail: THERAPIES SERIES
Discharge: HOME | End: 2024-03-06
Payer: MEDICAID

## 2024-03-06 PROCEDURE — 97110 THERAPEUTIC EXERCISES: CPT

## 2024-03-06 PROCEDURE — 97140 MANUAL THERAPY 1/> REGIONS: CPT

## 2024-03-06 PROCEDURE — 97530 THERAPEUTIC ACTIVITIES: CPT

## 2024-03-06 NOTE — PLAN OF CARE
[facility] - Outpatient Rehabilitation and Therapy [address], [place] [address] office: [phone] fax: [phone]         Physical Therapy: TREATMENT/PROGRESS NOTE   Patient: Dalton Yo (63 y.o. female)   Examination Date: 2024   :  1960 MRN: [id number]   Visit #: 2   Insurance Allowable Auth Needed   30 []Yes    [x]No    Insurance: Pay[place] / Plan: [place] / Product Type: *No Product type* /   Insurance ID: [id number] - (Medicaid Managed)  Secondary Insurance (if applicable):    Treatment Diagnosis:     ICD-10-CM    1. Bilateral low back pain with bilateral sciatica, unspecified chronicity  M54.42     M54.41          Medical Diagnosis:  Degenerative lumbar spinal stenosis [M48.061]  DDD (degenerative disc disease), lumbar [M51.36]  Lumbar spondylosis [M47.816]  Low back pain, unspecified back pain laterality, unspecified chronicity, unspecified whether sciatica present [M54.50]   Referring Physician: Dileep Madden*  PCP: Shaylee Valentin APRN - CNP       Plan of care signed (Y/N):     Date of Patient follow up with Physician:      Progress Report/POC: EVAL today  POC update due: (10 visits /OR AUTH LIMITS, whichever is less)  3/29/2024                                             Precautions/ Contra-indications:           Latex allergy:  NO  Pacemaker:    NO  Contraindications for Manipulation: None  Date of Surgery: N/A  Other:    Red Flags:  None    C-SSRS Triggered by Intake questionnaire:   [x] No, Questionnaire did not trigger screening.   [] Yes, Patient intake triggered further evaluation      [] C-SSRS Screening completed  [] PCP notified via Plan of Care  [] Emergency services notified     Preferred Language for Healthcare:   [] English       [x] other: Spanish in-person  present for today's session    SUBJECTIVE EXAMINATION     Patient stated complaint: Pt reports that her back and legs feel better

## 2024-03-12 ENCOUNTER — HOSPITAL ENCOUNTER (OUTPATIENT)
Dept: PHYSICAL THERAPY | Age: 64
Setting detail: THERAPIES SERIES
Discharge: HOME | End: 2024-03-12
Payer: MEDICAID

## 2024-03-12 PROCEDURE — 97530 THERAPEUTIC ACTIVITIES: CPT

## 2024-03-12 PROCEDURE — 97140 MANUAL THERAPY 1/> REGIONS: CPT

## 2024-03-12 PROCEDURE — 97110 THERAPEUTIC EXERCISES: CPT

## 2024-03-12 NOTE — PLAN OF CARE
[facility] - Outpatient Rehabilitation and Therapy [address], [place] [address] office: [phone] fax: [phone]         Physical Therapy: TREATMENT/PROGRESS NOTE   Patient: Dalton Yo (63 y.o. female)   Examination Date: 2024   :  1960 MRN: [id number]   Visit #: 3   Insurance Allowable Auth Needed   30 []Yes    [x]No    Insurance: Pay[place]: [place] / Plan: [place] / Product Type: *No Product type* /   Insurance ID: [id number] - (Medicaid Managed)  Secondary Insurance (if applicable):    Treatment Diagnosis:     ICD-10-CM    1. Bilateral low back pain with bilateral sciatica, unspecified chronicity  M54.42     M54.41          Medical Diagnosis:  Degenerative lumbar spinal stenosis [M48.061]  DDD (degenerative disc disease), lumbar [M51.36]  Lumbar spondylosis [M47.816]  Low back pain, unspecified back pain laterality, unspecified chronicity, unspecified whether sciatica present [M54.50]   Referring Physician: Dileep Madden*  PCP: Shaylee Valentin APRN - CNP       Plan of care signed (Y/N):     Date of Patient follow up with Physician:      Progress Report/POC: NO  POC update due: (10 visits /OR AUTH LIMITS, whichever is less)  3/29/2024                                             Precautions/ Contra-indications:           Latex allergy:  NO  Pacemaker:    NO  Contraindications for Manipulation: None  Date of Surgery: N/A  Other:    Red Flags:  None    C-SSRS Triggered by Intake questionnaire:   [x] No, Questionnaire did not trigger screening.   [] Yes, Patient intake triggered further evaluation      [] C-SSRS Screening completed  [] PCP notified via Plan of Care  [] Emergency services notified     Preferred Language for Healthcare:   [] English       [x] other: Hungarian (green machine)    SUBJECTIVE EXAMINATION     Patient stated complaint: Pt reports lower back and R LE are feeling much better, not much pain at all. Pt

## 2024-03-14 ENCOUNTER — HOSPITAL ENCOUNTER (OUTPATIENT)
Dept: PHYSICAL THERAPY | Age: 64
Setting detail: THERAPIES SERIES
Discharge: HOME | End: 2024-03-14
Payer: MEDICAID

## 2024-03-14 PROCEDURE — 97110 THERAPEUTIC EXERCISES: CPT

## 2024-03-14 PROCEDURE — 97530 THERAPEUTIC ACTIVITIES: CPT

## 2024-03-14 PROCEDURE — 97140 MANUAL THERAPY 1/> REGIONS: CPT

## 2024-03-19 ENCOUNTER — TELEPHONE (OUTPATIENT)
Dept: ORTHOPEDIC SURGERY | Age: 64
End: 2024-03-19

## 2024-03-19 ENCOUNTER — HOSPITAL ENCOUNTER (OUTPATIENT)
Dept: PHYSICAL THERAPY | Age: 64
Setting detail: THERAPIES SERIES
Discharge: HOME | End: 2024-03-19
Payer: MEDICAID

## 2024-03-19 PROCEDURE — 97530 THERAPEUTIC ACTIVITIES: CPT

## 2024-03-19 PROCEDURE — 97140 MANUAL THERAPY 1/> REGIONS: CPT

## 2024-03-19 PROCEDURE — 97110 THERAPEUTIC EXERCISES: CPT

## 2024-03-19 NOTE — FLOWSHEET NOTE
[facility] - Outpatient Rehabilitation and Therapy [address], [place] [address] office: [phone] fax: [phone]         Physical Therapy: TREATMENT/PROGRESS NOTE   Patient: Dalton Yo (63 y.o. female)   Examination Date: 2024   :  1960 MRN: [id number]   Visit #: 5   Insurance Allowable Auth Needed   30 []Yes    [x]No    Insurance: [place] / Plan: [place] / Product Type: *No Product type* /   Insurance ID: [id number] - (Medicaid Managed)  Secondary Insurance (if applicable):    Treatment Diagnosis:     ICD-10-CM    1. Bilateral low back pain with bilateral sciatica, unspecified chronicity  M54.42     M54.41          Medical Diagnosis:  Degenerative lumbar spinal stenosis [M48.061]  DDD (degenerative disc disease), lumbar [M51.36]  Lumbar spondylosis [M47.816]  Low back pain, unspecified back pain laterality, unspecified chronicity, unspecified whether sciatica present [M54.50]   Referring Physician: Dileep Madden*  PCP: Shaylee Valentin APRN - CNP       Plan of care signed (Y/N):     Date of Patient follow up with Physician:      Progress Report/POC: NO  POC update due: (10 visits /OR AUTH LIMITS, whichever is less)  3/29/2024                                             Precautions/ Contra-indications:           Latex allergy:  NO  Pacemaker:    NO  Contraindications for Manipulation: None  Date of Surgery: N/A  Other:    Red Flags:  None    C-SSRS Triggered by Intake questionnaire:   [x] No, Questionnaire did not trigger screening.   [] Yes, Patient intake triggered further evaluation      [] C-SSRS Screening completed  [] PCP notified via Plan of Care  [] Emergency services notified     Preferred Language for Healthcare:   [] English       [x] other: Greenlandic (green machine)    SUBJECTIVE EXAMINATION     Patient stated complaint: Patient reports that her pain continues to improve with physical therapy. Patient is

## 2024-03-20 NOTE — TELEPHONE ENCOUNTER
Patient still has one refill left on this medication. I have sent the patient a message letting her know.

## 2024-03-21 ENCOUNTER — APPOINTMENT (OUTPATIENT)
Dept: PHYSICAL THERAPY | Age: 64
End: 2024-03-21
Payer: MEDICAID

## 2024-03-25 ENCOUNTER — APPOINTMENT (OUTPATIENT)
Dept: CT IMAGING | Age: 64
End: 2024-03-25
Payer: MEDICAID

## 2024-03-25 ENCOUNTER — HOSPITAL ENCOUNTER (EMERGENCY)
Age: 64
Discharge: HOME | End: 2024-03-25
Payer: MEDICAID

## 2024-03-25 ENCOUNTER — APPOINTMENT (OUTPATIENT)
Dept: GENERAL RADIOLOGY | Age: 64
End: 2024-03-25
Payer: MEDICAID

## 2024-03-25 VITALS
OXYGEN SATURATION: 100 % | DIASTOLIC BLOOD PRESSURE: 87 MMHG | TEMPERATURE: 98.1 F | SYSTOLIC BLOOD PRESSURE: 160 MMHG | WEIGHT: 194.9 LBS | BODY MASS INDEX: 30.53 KG/M2 | RESPIRATION RATE: 16 BRPM | HEART RATE: 62 BPM

## 2024-03-25 DIAGNOSIS — M54.16 LUMBAR RADICULOPATHY: ICD-10-CM

## 2024-03-25 DIAGNOSIS — M54.2 NECK PAIN: Primary | ICD-10-CM

## 2024-03-25 LAB
ALBUMIN SERPL-MCNC: 4.8 G/DL (ref 3.4–5)
ALBUMIN/GLOB SERPL: 1.4 {RATIO} (ref 1.1–2.2)
ALP SERPL-CCNC: 85 U/L (ref 40–129)
ALT SERPL-CCNC: 23 U/L (ref 10–40)
ANION GAP SERPL CALCULATED.3IONS-SCNC: 12 MMOL/L (ref 3–16)
AST SERPL-CCNC: 21 U/L (ref 15–37)
BASOPHILS # BLD: 0.1 K/UL (ref 0–0.2)
BASOPHILS NFR BLD: 0.8 %
BILIRUB SERPL-MCNC: <0.2 MG/DL (ref 0–1)
BILIRUB UR QL STRIP.AUTO: NEGATIVE
BUN SERPL-MCNC: 15 MG/DL (ref 7–20)
CALCIUM SERPL-MCNC: 10 MG/DL (ref 8.3–10.6)
CHLORIDE SERPL-SCNC: 103 MMOL/L (ref 99–110)
CLARITY UR: CLEAR
CO2 SERPL-SCNC: 25 MMOL/L (ref 21–32)
COLOR UR: YELLOW
CREAT SERPL-MCNC: 0.8 MG/DL (ref 0.6–1.2)
D DIMER: <0.27 UG/ML FEU (ref 0–0.6)
DEPRECATED RDW RBC AUTO: 13.4 % (ref 12.4–15.4)
EOSINOPHIL # BLD: 0.1 K/UL (ref 0–0.6)
EOSINOPHIL NFR BLD: 1.7 %
GFR SERPLBLD CREATININE-BSD FMLA CKD-EPI: 84 ML/MIN/{1.73_M2}
GLUCOSE BLD-MCNC: 103 MG/DL (ref 70–99)
GLUCOSE SERPL-MCNC: 94 MG/DL (ref 70–99)
GLUCOSE UR STRIP.AUTO-MCNC: NEGATIVE MG/DL
HCT VFR BLD AUTO: 40.7 % (ref 36–48)
HGB BLD-MCNC: 13.1 G/DL (ref 12–16)
HGB UR QL STRIP.AUTO: NEGATIVE
KETONES UR STRIP.AUTO-MCNC: NEGATIVE MG/DL
LEUKOCYTE ESTERASE UR QL STRIP.AUTO: NEGATIVE
LYMPHOCYTES # BLD: 3.2 K/UL (ref 1–5.1)
LYMPHOCYTES NFR BLD: 41.7 %
MCH RBC QN AUTO: 30.6 PG (ref 26–34)
MCHC RBC AUTO-ENTMCNC: 32.1 G/DL (ref 31–36)
MCV RBC AUTO: 95.5 FL (ref 80–100)
MONOCYTES # BLD: 0.5 K/UL (ref 0–1.3)
MONOCYTES NFR BLD: 6.7 %
NEUTROPHILS # BLD: 3.8 K/UL (ref 1.7–7.7)
NEUTROPHILS NFR BLD: 49.1 %
NITRITE UR QL STRIP.AUTO: NEGATIVE
NT-PROBNP SERPL-MCNC: 82 PG/ML (ref 0–124)
PERFORMED ON: ABNORMAL
PH UR STRIP.AUTO: 6.5 [PH] (ref 5–8)
PLATELET # BLD AUTO: 380 K/UL (ref 135–450)
PMV BLD AUTO: 7.9 FL (ref 5–10.5)
POTASSIUM SERPL-SCNC: 4 MMOL/L (ref 3.5–5.1)
PROT SERPL-MCNC: 8.3 G/DL (ref 6.4–8.2)
PROT UR STRIP.AUTO-MCNC: NEGATIVE MG/DL
RBC # BLD AUTO: 4.26 M/UL (ref 4–5.2)
SODIUM SERPL-SCNC: 140 MMOL/L (ref 136–145)
SP GR UR STRIP.AUTO: <=1.005 (ref 1–1.03)
TROPONIN, HIGH SENSITIVITY: 7 NG/L (ref 0–14)
TROPONIN, HIGH SENSITIVITY: <6 NG/L (ref 0–14)
UA COMPLETE W REFLEX CULTURE PNL UR: NORMAL
UA DIPSTICK W REFLEX MICRO PNL UR: NORMAL
URN SPEC COLLECT METH UR: NORMAL
UROBILINOGEN UR STRIP-ACNC: 0.2 E.U./DL
WBC # BLD AUTO: 7.8 K/UL (ref 4–11)

## 2024-03-25 PROCEDURE — 6360000002 HC RX W HCPCS: Performed by: PHYSICIAN ASSISTANT

## 2024-03-25 PROCEDURE — 85379 FIBRIN DEGRADATION QUANT: CPT

## 2024-03-25 PROCEDURE — 80053 COMPREHEN METABOLIC PANEL: CPT

## 2024-03-25 PROCEDURE — 84484 ASSAY OF TROPONIN QUANT: CPT

## 2024-03-25 PROCEDURE — 99285 EMERGENCY DEPT VISIT HI MDM: CPT

## 2024-03-25 PROCEDURE — 83880 ASSAY OF NATRIURETIC PEPTIDE: CPT

## 2024-03-25 PROCEDURE — 96374 THER/PROPH/DIAG INJ IV PUSH: CPT

## 2024-03-25 PROCEDURE — 96375 TX/PRO/DX INJ NEW DRUG ADDON: CPT

## 2024-03-25 PROCEDURE — 85025 COMPLETE CBC W/AUTO DIFF WBC: CPT

## 2024-03-25 PROCEDURE — 71045 X-RAY EXAM CHEST 1 VIEW: CPT

## 2024-03-25 PROCEDURE — 93005 ELECTROCARDIOGRAM TRACING: CPT | Performed by: PHYSICIAN ASSISTANT

## 2024-03-25 PROCEDURE — 72125 CT NECK SPINE W/O DYE: CPT

## 2024-03-25 PROCEDURE — 81003 URINALYSIS AUTO W/O SCOPE: CPT

## 2024-03-25 PROCEDURE — 6370000000 HC RX 637 (ALT 250 FOR IP): Performed by: PHYSICIAN ASSISTANT

## 2024-03-25 PROCEDURE — 72131 CT LUMBAR SPINE W/O DYE: CPT

## 2024-03-25 RX ORDER — METHYLPREDNISOLONE 4 MG/1
TABLET ORAL
Qty: 1 KIT | Refills: 0 | Status: SHIPPED | OUTPATIENT
Start: 2024-03-25 | End: 2024-03-31

## 2024-03-25 RX ORDER — HYDROCODONE BITARTRATE AND ACETAMINOPHEN 5; 325 MG/1; MG/1
1 TABLET ORAL ONCE
Status: COMPLETED | OUTPATIENT
Start: 2024-03-25 | End: 2024-03-25

## 2024-03-25 RX ORDER — KETOROLAC TROMETHAMINE 30 MG/ML
30 INJECTION, SOLUTION INTRAMUSCULAR; INTRAVENOUS ONCE
Status: COMPLETED | OUTPATIENT
Start: 2024-03-25 | End: 2024-03-25

## 2024-03-25 RX ORDER — LIDOCAINE 50 MG/G
1 PATCH TOPICAL DAILY
Qty: 30 PATCH | Refills: 0 | Status: SHIPPED | OUTPATIENT
Start: 2024-03-25

## 2024-03-25 RX ORDER — METHOCARBAMOL 750 MG/1
750 TABLET, FILM COATED ORAL 4 TIMES DAILY
Qty: 40 TABLET | Refills: 0 | Status: SHIPPED | OUTPATIENT
Start: 2024-03-25 | End: 2024-04-04

## 2024-03-25 RX ORDER — IBUPROFEN 600 MG/1
600 TABLET ORAL EVERY 6 HOURS PRN
Qty: 120 TABLET | Refills: 0 | Status: SHIPPED | OUTPATIENT
Start: 2024-03-25

## 2024-03-25 RX ORDER — ORPHENADRINE CITRATE 30 MG/ML
60 INJECTION INTRAMUSCULAR; INTRAVENOUS ONCE
Status: COMPLETED | OUTPATIENT
Start: 2024-03-25 | End: 2024-03-25

## 2024-03-25 RX ADMIN — ORPHENADRINE CITRATE 60 MG: 60 INJECTION INTRAMUSCULAR; INTRAVENOUS at 17:03

## 2024-03-25 RX ADMIN — KETOROLAC TROMETHAMINE 30 MG: 30 INJECTION, SOLUTION INTRAMUSCULAR at 17:03

## 2024-03-25 RX ADMIN — HYDROCODONE BITARTRATE AND ACETAMINOPHEN 1 TABLET: 5; 325 TABLET ORAL at 17:03

## 2024-03-25 ASSESSMENT — ENCOUNTER SYMPTOMS
COUGH: 0
PHOTOPHOBIA: 0
VOMITING: 0
CONSTIPATION: 0
CHEST TIGHTNESS: 0
BACK PAIN: 1
SHORTNESS OF BREATH: 0
NAUSEA: 0
ABDOMINAL PAIN: 0
RESPIRATORY NEGATIVE: 1
DIARRHEA: 0
COLOR CHANGE: 0

## 2024-03-25 ASSESSMENT — LIFESTYLE VARIABLES
HOW MANY STANDARD DRINKS CONTAINING ALCOHOL DO YOU HAVE ON A TYPICAL DAY: PATIENT DOES NOT DRINK
HOW OFTEN DO YOU HAVE A DRINK CONTAINING ALCOHOL: NEVER

## 2024-03-25 NOTE — ED PROVIDER NOTES
EKG interpretation by me in absence of a face-to-face evaluation by me as follows:    The 12 lead EKG was interpreted by me independent of a cardiologist as follows:  Rate: normal with a rate of 69  Rhythm: sinus, PAC  Axis: normal  Intervals: normal AR, narrow QRS, normal QTc  ST segments: no ST elevations or depressions  T waves: no abnormal inversions  Non-specific T wave changes: not present  Prior EKG comparison: EKG dated 11/4/23 is not significantly different        Raul Ellis MD  03/25/24 8842    
- Abnormal; Notable for the following components:       Result Value    Total Protein 8.3 (*)     All other components within normal limits   POCT GLUCOSE - Abnormal; Notable for the following components:    POC Glucose 103 (*)     All other components within normal limits   CBC WITH AUTO DIFFERENTIAL   TROPONIN   BRAIN NATRIURETIC PEPTIDE   URINALYSIS WITH REFLEX TO CULTURE   D-DIMER, QUANTITATIVE   TROPONIN       When ordered only abnormal lab results are displayed. All other labs were within normal range or not returned as of this dictation.    EKG: When ordered, EKG's are interpreted by the Emergency Department Physician in the absence of a cardiologist.  Please see their note for interpretation of EKG.    RADIOLOGY:   Non-plain film images such as CT, Ultrasound and MRI are read by the radiologist. Plain radiographic images are visualized and preliminarily interpreted by the ED Provider with the below findings:      Interpretation per the Radiologist below, if available at the time of this note:    CT LUMBAR SPINE WO CONTRAST   Preliminary Result   1. No evidence of acute traumatic injury or malalignment of the lumbar or   sacral spine.   2. L4-5 broad-based disc herniation with severe canal stenosis and severe   bilateral foraminal stenosis. There is also impingement in bilateral lateral   recesses.   3. L2-3 broad-based disc bulge as well as bilateral facet arthropathy with   mild central stenosis and significant bilateral foraminal stenosis.         CT CERVICAL SPINE WO CONTRAST   Final Result   1. No acute osseous abnormality of the cervical spine.   2. Degenerative changes are seen at C5-C6 level with suggestion of posterior   disc bulge by 5 mm and causing moderate canal stenosis.         XR CHEST PORTABLE   Final Result   No acute abnormality           No results found.    No results found.    PROCEDURES   Unless otherwise noted below, none     Procedures    CRITICAL CARE TIME (.cctime)       PAST

## 2024-03-25 NOTE — ED NOTES
All follow up care discussed. Pt verbalized understandings. Prescriptions discussed. Stable and ambulatory upon dismissal.

## 2024-03-26 LAB
EKG ATRIAL RATE: 69 BPM
EKG DIAGNOSIS: NORMAL
EKG P AXIS: 59 DEGREES
EKG P-R INTERVAL: 144 MS
EKG Q-T INTERVAL: 394 MS
EKG QRS DURATION: 72 MS
EKG QTC CALCULATION (BAZETT): 422 MS
EKG R AXIS: 0 DEGREES
EKG T AXIS: 51 DEGREES
EKG VENTRICULAR RATE: 69 BPM

## 2024-03-26 PROCEDURE — 93010 ELECTROCARDIOGRAM REPORT: CPT | Performed by: INTERNAL MEDICINE

## 2024-03-27 ENCOUNTER — HOSPITAL ENCOUNTER (OUTPATIENT)
Dept: PHYSICAL THERAPY | Age: 64
Setting detail: THERAPIES SERIES
Discharge: HOME | End: 2024-03-27
Payer: MEDICAID

## 2024-03-27 PROCEDURE — 97530 THERAPEUTIC ACTIVITIES: CPT

## 2024-03-27 PROCEDURE — 97140 MANUAL THERAPY 1/> REGIONS: CPT

## 2024-03-27 PROCEDURE — 97110 THERAPEUTIC EXERCISES: CPT

## 2024-03-27 NOTE — FLOWSHEET NOTE
especially her neck and into L shoulder as well as L side of back and leg. Pt reports that she went to ED, did CT scan of neck and her back. ED gave pt muscle relaxers and medrol dose pack. Pt reports that pain has since resolved - she denies any pain in neck and shoulder or L LE currently. Pt reports some discomfort and tightness in L side of lower back currently.          Test used Initial score  2/28/24 03/27/2024   Pain Summary VAS 9/10 1/10 currently   Functional questionnaire Modified Oswestry 46% disability    Other:                  OBJECTIVE EXAMINATION     2/28/24    ROM     Lumbar Flexion Fingertips to ankles *relieves pn    Lumbar Extension Mild restrictions     LEFT RIGHT   Lumbar sidebend 75% *decreases R sided sxs 75% *decreases L sided sxs   Lumbar Quadrant     Thoracic Rotation      LEFT RIGHT   HIP Flex WNL *decreases LBP WNL *decreases LBP   HIP Abd     HIP ER Dec *decreases LBP Dec *decreases LBP   HIP IR WNL WNL   Knee Flex     Knee ext     Hamstring length Mod limitations Mod limitations   Piriformis length Mod limitations Mod limitations   Wilson Test          Strength  LEFT RIGHT   ALL NORMAL []       MfA     TrA     HIP Flexors (L1-2) 4+ 4+   HIP Abductors     HIP extension     Knee EXT (L3) 4+ 4+   Knee Flex (S1) 4+ 4 *LBP   Ankle DF (L4) 4+ 4+   Ankle PF (S2) 4 4- *LBP   Great Toe Ext (L5)         Exercises/Interventions     Therapeutic Ex (54691)  resistance Sets/time Reps Notes/Cues/Progressions   Bike  5'     LTR  1 10    SKTC  30\" 3ea    Hand heel rock  5\" 10    BKFO teal 2 10    Bridges w/BS  2 10 Decreased ROM   LP DL 80# 3 10    Prone hip extension  2 10 Table flexed                 Manual Intervention (14819)  TIME            LAD   10'  L + R today    STM  6'  L>R lumbar paraspinals, QL   S/L neutral gapping  6'  L   Progress note NPV                     NMR re-education (34845) resistance Sets/time Reps CUES NEEDED                                      Therapeutic Activity (33070)

## 2024-04-01 DIAGNOSIS — I10 PRIMARY HYPERTENSION: ICD-10-CM

## 2024-04-02 RX ORDER — HYDROCHLOROTHIAZIDE 25 MG/1
25 TABLET ORAL EVERY MORNING
Qty: 90 TABLET | Refills: 1 | Status: SHIPPED | OUTPATIENT
Start: 2024-04-02

## 2024-04-02 NOTE — TELEPHONE ENCOUNTER
Medication:   Requested Prescriptions     Pending Prescriptions Disp Refills    hydroCHLOROthiazide (HYDRODIURIL) 25 MG tablet [Pharmacy Med Name: hydroCHLOROthiazide 25 MG TABLET] 90 tablet 1     Sig: TAKE ONE TABLET BY MOUTH EVERY MORNING       Last Filled:  3/23/2023, 90, 1    Patient Phone Number: [phone] (home)     Last appt: 12/13/2023   Next appt: 4/3/2024    Lab Results   Component Value Date     03/25/2024    K 4.0 03/25/2024     03/25/2024    CO2 25 03/25/2024    BUN 15 03/25/2024    CREATININE 0.8 03/25/2024    GLUCOSE 94 03/25/2024    CALCIUM 10.0 03/25/2024    PROT 8.3 (H) 03/25/2024    LABALBU 4.8 03/25/2024    BILITOT <0.2 03/25/2024    ALKPHOS 85 03/25/2024    AST 21 03/25/2024    ALT 23 03/25/2024    LABGLOM 84 03/25/2024    GFRAA >60 02/10/2022    AGRATIO 1.4 03/25/2024    GLOB 3.5 02/23/2019

## 2024-04-03 ENCOUNTER — APPOINTMENT (OUTPATIENT)
Dept: PHYSICAL THERAPY | Age: 64
End: 2024-04-03
Payer: MEDICAID

## 2024-04-04 ENCOUNTER — OFFICE VISIT (OUTPATIENT)
Dept: FAMILY MEDICINE CLINIC | Age: 64
End: 2024-04-04
Payer: MEDICAID

## 2024-04-04 VITALS
BODY MASS INDEX: 31.26 KG/M2 | OXYGEN SATURATION: 96 % | SYSTOLIC BLOOD PRESSURE: 138 MMHG | WEIGHT: 199.6 LBS | HEART RATE: 66 BPM | DIASTOLIC BLOOD PRESSURE: 88 MMHG

## 2024-04-04 DIAGNOSIS — I10 PRIMARY HYPERTENSION: Primary | ICD-10-CM

## 2024-04-04 DIAGNOSIS — M54.50 LUMBAR PAIN: ICD-10-CM

## 2024-04-04 PROCEDURE — 3075F SYST BP GE 130 - 139MM HG: CPT | Performed by: NURSE PRACTITIONER

## 2024-04-04 PROCEDURE — 99214 OFFICE O/P EST MOD 30 MIN: CPT | Performed by: NURSE PRACTITIONER

## 2024-04-04 PROCEDURE — 3079F DIAST BP 80-89 MM HG: CPT | Performed by: NURSE PRACTITIONER

## 2024-04-04 RX ORDER — IBUPROFEN 800 MG/1
800 TABLET ORAL
Qty: 90 TABLET | Refills: 0 | Status: SHIPPED | OUTPATIENT
Start: 2024-04-04

## 2024-04-04 RX ORDER — METHYLPREDNISOLONE 4 MG/1
TABLET ORAL
Qty: 1 KIT | Refills: 0 | Status: SHIPPED | OUTPATIENT
Start: 2024-04-04 | End: 2024-04-10

## 2024-04-04 RX ORDER — TRAMADOL HYDROCHLORIDE 50 MG/1
50 TABLET ORAL EVERY 4 HOURS PRN
Qty: 42 TABLET | Refills: 0 | Status: SHIPPED | OUTPATIENT
Start: 2024-04-04 | End: 2024-04-11

## 2024-04-04 SDOH — ECONOMIC STABILITY: FOOD INSECURITY: WITHIN THE PAST 12 MONTHS, THE FOOD YOU BOUGHT JUST DIDN'T LAST AND YOU DIDN'T HAVE MONEY TO GET MORE.: NEVER TRUE

## 2024-04-04 SDOH — ECONOMIC STABILITY: INCOME INSECURITY: HOW HARD IS IT FOR YOU TO PAY FOR THE VERY BASICS LIKE FOOD, HOUSING, MEDICAL CARE, AND HEATING?: NOT HARD AT ALL

## 2024-04-04 SDOH — ECONOMIC STABILITY: FOOD INSECURITY: WITHIN THE PAST 12 MONTHS, YOU WORRIED THAT YOUR FOOD WOULD RUN OUT BEFORE YOU GOT MONEY TO BUY MORE.: NEVER TRUE

## 2024-04-04 ASSESSMENT — ENCOUNTER SYMPTOMS
CHEST TIGHTNESS: 0
SHORTNESS OF BREATH: 0
BACK PAIN: 1
WHEEZING: 0
COUGH: 0
GASTROINTESTINAL NEGATIVE: 1

## 2024-04-04 ASSESSMENT — PATIENT HEALTH QUESTIONNAIRE - PHQ9: DEPRESSION UNABLE TO ASSESS: URGENT/EMERGENT SITUATION

## 2024-04-04 NOTE — PROGRESS NOTES
2024     Dalton Yo (:  1960) is a 63 y.o. female, here for evaluation of the following medical concerns:    Chief Complaint   Patient presents with    Hypertension     Follow up     Back Pain   :  408886/969938    Hypertension:  Home blood pressure monitoring: No.  She is adherent to a low sodium diet. Patient denies chest pain, shortness of breath, headache, lightheadedness, blurred vision, peripheral edema, palpitations, dry cough, and fatigue.  Antihypertensive medication side effects: no medication side effects noted.  Use of agents associated with hypertension: none.  She is eating a healthy diet.  Staying busy.      Back Pain: Having back pain radiating down into her left knee. She is taking Ibuprofen 800mg for the pain but it is not helping much. She is seeing a ortho specialist for this currently and is doing PT.                                        Sodium (mmol/L)   Date Value   2024 140    BUN (mg/dL)   Date Value   2024 15    Glucose (mg/dL)   Date Value   2024 94      Potassium reflex Magnesium (mmol/L)   Date Value   2024 4.0    Creatinine (mg/dL)   Date Value   2024 0.8           Review of Systems   Constitutional:  Negative for chills, diaphoresis, fatigue and fever.   Respiratory:  Negative for cough, chest tightness, shortness of breath and wheezing.    Cardiovascular:  Negative for chest pain and palpitations.   Gastrointestinal: Negative.    Genitourinary: Negative.    Musculoskeletal:  Positive for back pain.   Neurological:  Negative for dizziness, weakness and headaches.     Prior to Visit Medications    Medication Sig Taking? Authorizing Provider   traMADol (ULTRAM) 50 MG tablet Take 1 tablet by mouth every 4 hours as needed for Pain for up to 7 days. Intended supply: 7 days. Take lowest dose possible to manage pain Max Daily Amount: 300 mg Yes Shaylee Valentin, APRN - CNP   methylPREDNISolone (MEDROL DOSEPACK) 4 MG tablet Take by

## 2024-04-09 ENCOUNTER — HOSPITAL ENCOUNTER (OUTPATIENT)
Dept: PHYSICAL THERAPY | Age: 64
Setting detail: THERAPIES SERIES
Discharge: HOME | End: 2024-04-09
Payer: MEDICAID

## 2024-04-09 PROCEDURE — 97140 MANUAL THERAPY 1/> REGIONS: CPT

## 2024-04-09 PROCEDURE — 97530 THERAPEUTIC ACTIVITIES: CPT

## 2024-04-09 NOTE — PLAN OF CARE
[facility] - Outpatient Rehabilitation and Therapy [address], [place] [address] office: [phone] fax: [phone]    Physical Therapy Re-Certification Plan of Care    Dear Dileep Madden  ,    We had the pleasure of treating the following patient for physical therapy services at [facility]. A summary of our findings can be found in the updated assessment below.  This includes our plan of care.  If you have any questions or concerns regarding these findings, please do not hesitate to contact me at the office phone number checked above.  Thank you for the referral.     Physician Signature:________________________________Date:__________________  By signing above (or electronic signature), therapist's plan is approved by physician      Functional Outcome:   Dalton Yo 1960 continues to present with functional deficits in endurance of strength and muscle activation  limiting ability with walking on uneven ground, transitions between positions, carrying items, lifting items, and heavy home activity .  During therapy this date, patient required verbal cueing, tactile cueing, muscle facilitation, modification of technique, progression of exercises and program, and manual interventions for exercise progression, improving proper muscle recruitment and activation/motor control patterns, modulating pain, static and dynamic balance, and improving postural awareness. Patient will continue to benefit from ongoing evaluation and advanced clinical decision from a Physical Therapist to improve pain control, muscle strength, neuromuscular control, endurance, normalization of gait, balance and proprioception, functional mobility, and proper body mechanics to safely return to [facility] without symptoms or restrictions.    Overall Response to Treatment:   [x]Patient is responding well to treatment and improvement is noted with regards to

## 2024-04-11 ENCOUNTER — APPOINTMENT (OUTPATIENT)
Dept: PHYSICAL THERAPY | Age: 64
End: 2024-04-11
Payer: MEDICAID

## 2024-04-11 ENCOUNTER — HOSPITAL ENCOUNTER (OUTPATIENT)
Dept: PHYSICAL THERAPY | Age: 64
Setting detail: THERAPIES SERIES
Discharge: HOME | End: 2024-04-11
Payer: MEDICAID

## 2024-04-11 PROCEDURE — 97110 THERAPEUTIC EXERCISES: CPT

## 2024-04-11 PROCEDURE — 97530 THERAPEUTIC ACTIVITIES: CPT

## 2024-04-11 PROCEDURE — 97140 MANUAL THERAPY 1/> REGIONS: CPT

## 2024-04-11 NOTE — FLOWSHEET NOTE
[facility] [place] - Outpatient Rehabilitation and Therapy [address], [place] [address] office: [phone] fax: [phone]        Physical Therapy: TREATMENT/PROGRESS NOTE   Patient: Dalton Yo (63 y.o. female)   Examination Date: 2024   :  1960 MRN: [id number]   Visit #: 8   Insurance Allowable Auth Needed   30 []Yes    [x]No    Insurance: Pay[place]: [place] / Plan: [place] / Product Type: *No Product type* /   Insurance ID: [id number] - (Medicaid Managed)  Secondary Insurance (if applicable):    Treatment Diagnosis:     ICD-10-CM    1. Bilateral low back pain with bilateral sciatica, unspecified chronicity  M54.42     M54.41          Medical Diagnosis:  Degenerative lumbar spinal stenosis [M48.061]  DDD (degenerative disc disease), lumbar [M51.36]  Lumbar spondylosis [M47.816]  Low back pain, unspecified back pain laterality, unspecified chronicity, unspecified whether sciatica present [M54.50]   Referring Physician: Dileep Madden*  PCP: Shaylee Valentin APRN - CNP       Plan of care signed (Y/N):     Date of Patient follow up with Physician: 25     Progress Report/POC: NO  POC update due: (10 visits /OR AUTH LIMITS, whichever is less)  24                                            Precautions/ Contra-indications:           Latex allergy:  NO  Pacemaker:    NO  Contraindications for Manipulation: None  Date of Surgery: N/A  Other:    Red Flags:  None    C-SSRS Triggered by Intake questionnaire:   [x] No, Questionnaire did not trigger screening.   [] Yes, Patient intake triggered further evaluation      [] C-SSRS Screening completed  [] PCP notified via Plan of Care  [] Emergency services notified     Preferred Language for Healthcare:   [] English       [x] other: Nepali (green machine)    SUBJECTIVE EXAMINATION     Patient stated complaint: Pt reports that she currently has no pain but was a little more sore after last PT

## 2024-04-15 ENCOUNTER — HOSPITAL ENCOUNTER (OUTPATIENT)
Dept: PHYSICAL THERAPY | Age: 64
Setting detail: THERAPIES SERIES
Discharge: HOME | End: 2024-04-15
Payer: MEDICAID

## 2024-04-15 PROCEDURE — 97110 THERAPEUTIC EXERCISES: CPT

## 2024-04-15 PROCEDURE — 97530 THERAPEUTIC ACTIVITIES: CPT

## 2024-04-15 PROCEDURE — 97140 MANUAL THERAPY 1/> REGIONS: CPT

## 2024-04-15 NOTE — FLOWSHEET NOTE
notes for documentation efficiency.     Therapist was present, directed the patient's care, made skilled judgement, and was responsible for assessment and treatment of the patient. Chanel Tolbert, PT

## 2024-04-16 ENCOUNTER — OFFICE VISIT (OUTPATIENT)
Dept: ORTHOPEDIC SURGERY | Age: 64
End: 2024-04-16

## 2024-04-16 VITALS — BODY MASS INDEX: 31.03 KG/M2 | WEIGHT: 198.1 LBS

## 2024-04-16 DIAGNOSIS — M51.36 DDD (DEGENERATIVE DISC DISEASE), LUMBAR: Primary | ICD-10-CM

## 2024-04-16 DIAGNOSIS — M48.062 SPINAL STENOSIS OF LUMBAR REGION WITH NEUROGENIC CLAUDICATION: ICD-10-CM

## 2024-04-16 DIAGNOSIS — M47.816 LUMBAR SPONDYLOSIS: ICD-10-CM

## 2024-04-16 DIAGNOSIS — M48.061 DEGENERATIVE LUMBAR SPINAL STENOSIS: ICD-10-CM

## 2024-04-16 RX ORDER — LIDOCAINE 50 MG/G
PATCH TOPICAL
Qty: 30 PATCH | Refills: 2 | Status: SHIPPED | OUTPATIENT
Start: 2024-04-16

## 2024-04-16 NOTE — PROGRESS NOTES
Chief Complaint:   Chief Complaint   Patient presents with    Lower Back Pain     F/U LUMBAR. Has been to PT 8 times. Pain has decreased with PT. Has been taking prescribed medications which help. Sometimes has no pain for a few days following PT> Doesn't need an extra pillow anymore to sleep.          History of Present Illness:       Patient is a 63 y.o. female returns follow up for the above complaint. The patient was last seen approximately 2 monthsago. The symptoms are improving since the last visit. The patient has had no further testing for the problem.      Back:Bilateral leg pain 50:50. Pain in back and legs is aching in quality.    Pain levels: 0-8.  She has more good days than bad days and is more functional with ADLs.  Symptoms follow neurogenic claudication pattern    The patient has started supervised PT.    The patient denies new onset or progressive weakness of the lower extremities.  The patient denies new onset bowel or bladder dysfunction.    She continues on medical pain management as per previous inclusive of NSAID- , muscle relaxant-     Past Medical History:        Past Medical History:   Diagnosis Date    Cervical cancer screening 2016    Nml per pt'.    Chronic back pain     HTN (hypertension) 02/09/2022    Hypercholesteremia 02/11/2022    lifetime ASCVD risk 39%        Present Medications:         Current Outpatient Medications   Medication Sig Dispense Refill    lidocaine (LIDODERM) 5 % Apply as needed for pain 1-3 patches at time 12 hours on, 12 hours off 30 patch 2    ibuprofen (ADVIL;MOTRIN) 800 MG tablet Take 1 tablet by mouth 3 times daily (with meals) 90 tablet 0    hydroCHLOROthiazide (HYDRODIURIL) 25 MG tablet TAKE ONE TABLET BY MOUTH EVERY MORNING 90 tablet 1    lidocaine (LIDODERM) 5 % Place 1 patch onto the skin daily 12 hours on, 12 hours off. 30 patch 0    losartan (COZAAR) 50 MG tablet Take 1 tablet by mouth daily 90 tablet 1     No current facility-administered medications

## 2024-04-18 ENCOUNTER — HOSPITAL ENCOUNTER (OUTPATIENT)
Dept: PHYSICAL THERAPY | Age: 64
Setting detail: THERAPIES SERIES
Discharge: HOME | End: 2024-04-18
Payer: MEDICAID

## 2024-04-18 PROCEDURE — 97140 MANUAL THERAPY 1/> REGIONS: CPT

## 2024-04-18 PROCEDURE — 97530 THERAPEUTIC ACTIVITIES: CPT

## 2024-04-18 PROCEDURE — 97110 THERAPEUTIC EXERCISES: CPT

## 2024-04-18 NOTE — FLOWSHEET NOTE
emphasis/focus on exercise progression, improving proper muscle recruitment and activation/motor control patterns, modulating pain, increasing ROM, and static and dynamic balance. Next visit plan to progress weights, progress reps, add new exercises, and progress balance Decrease PT to 1x/week with transition to IHEP and gym program.     Electronically Signed by WILLOW ADRIAN SPT  Date: 04/18/2024    Note: Portions of this note have been templated and/or copied from initial evaluation, reassessments and prior notes for documentation efficiency.     Therapist was present, directed the patient's care, made skilled judgement, and was responsible for assessment and treatment of the patient. Chanel Tolbert, PT

## 2024-04-25 ENCOUNTER — HOSPITAL ENCOUNTER (OUTPATIENT)
Dept: PHYSICAL THERAPY | Age: 64
Setting detail: THERAPIES SERIES
Discharge: HOME | End: 2024-04-25
Payer: MEDICAID

## 2024-04-25 PROCEDURE — 97140 MANUAL THERAPY 1/> REGIONS: CPT

## 2024-04-25 PROCEDURE — 97110 THERAPEUTIC EXERCISES: CPT

## 2024-04-25 NOTE — FLOWSHEET NOTE
fatigues quickly.         Test used Initial score  2/28/24 04/25/2024   Pain Summary VAS 9/10 0/10 in low back   Functional questionnaire Modified Oswestry 46% disability    Other:                  OBJECTIVE EXAMINATION     04/09/24  ROM     Lumbar Flexion Fingertips to toes    Lumbar Extension WNL     LEFT RIGHT   Lumbar sidebend 100% 100%   Lumbar Quadrant     Thoracic Rotation      LEFT RIGHT   HIP Flex WNL  WNL    HIP Abd     HIP ER     HIP IR WNL WNL   Knee Flex     Knee ext     Hamstring length     Piriformis length     Wilson Test          Strength  LEFT RIGHT   ALL NORMAL []       MfA     TrA     HIP Flexors (L1-2) 4+ 4+   HIP Abductors     HIP extension     Knee EXT (L3) 4+ 4+   Knee Flex (S1) 4+ 4+   Ankle DF (L4) 4+ 4+   Ankle PF (S2) 4 4   Great Toe Ext (L5)         Exercises/Interventions     Therapeutic Ex (03274)  resistance Sets/time Reps Notes/Cues/Progressions   Bike Level 6 5'     LTR  1 10    SKTC  30\" 3ea    Hand heel rock  5\" 10    BKFO  2 10    Bridges   3 10    LP DL  LP SL 90#   3  3 10  10 ea    Prone hip extension  2 10 Table flexed   Knee ext machine DL 15# 2 10    Knee flexion machine DL  30# 2 10    REN hip abduction  30# 2 10 ea     SLR  3 10    Clamshells   3 10 ea     Manual Intervention (04513)  TIME            LAD   10'  L + R today    STM  10'  L>R lumbar paraspinals, QL  Performed side lying this date   S/L neutral gapping  5'  L                        NMR re-education (75933) resistance Sets/time Reps CUES NEEDED                                      Therapeutic Activity (60697)  Sets/time            Pt education  5'  Plan of care. Gym equipment and HEP    Standing hip abduction  2 10x ea    Lateral stepping at 1/2 wall teal 1 3 laps    Sit to stands Chair + airex 3 10    Farmer's carry  3# 2 laps    Suitcase carry   L #3  R #3 1 lap  1 lap R handed carry increased L radiating sx   Step Ups with dumbbells  6\" 1 15x ea 5# dumbbell each hand with knee drive   PT re-assessment   25'

## 2024-07-29 DIAGNOSIS — M54.50 LUMBAR PAIN: ICD-10-CM

## 2024-07-30 RX ORDER — IBUPROFEN 800 MG/1
TABLET ORAL
Qty: 90 TABLET | Refills: 0 | Status: SHIPPED | OUTPATIENT
Start: 2024-07-30

## 2024-07-30 NOTE — TELEPHONE ENCOUNTER
Medication:   Requested Prescriptions     Pending Prescriptions Disp Refills    ibuprofen (ADVIL;MOTRIN) 800 MG tablet [Pharmacy Med Name: IBUPROFEN 800 MG TABLET] 90 tablet 0     Sig: TAKE 1 TABLET BY MOUTH 3 TIMES A DAY WITH A MEAL        Last Filled:  4/4/2024, 90, 0    Patient Phone Number: [phone] (home)     Last appt: 4/4/2024   Next appt: Visit date not found    Last OARRS:        No data to display

## 2024-09-09 ENCOUNTER — TELEPHONE (OUTPATIENT)
Dept: FAMILY MEDICINE CLINIC | Age: 64
End: 2024-09-09

## 2024-09-17 ENCOUNTER — OFFICE VISIT (OUTPATIENT)
Dept: FAMILY MEDICINE CLINIC | Age: 64
End: 2024-09-17

## 2024-09-17 VITALS
BODY MASS INDEX: 27.94 KG/M2 | WEIGHT: 178 LBS | DIASTOLIC BLOOD PRESSURE: 84 MMHG | SYSTOLIC BLOOD PRESSURE: 132 MMHG | OXYGEN SATURATION: 98 % | TEMPERATURE: 97.3 F | HEIGHT: 67 IN | HEART RATE: 62 BPM

## 2024-09-17 DIAGNOSIS — M54.50 LUMBAR PAIN: ICD-10-CM

## 2024-09-17 DIAGNOSIS — M54.42 CHRONIC LEFT-SIDED LOW BACK PAIN WITH LEFT-SIDED SCIATICA: Primary | ICD-10-CM

## 2024-09-17 DIAGNOSIS — G89.29 CHRONIC LEFT-SIDED LOW BACK PAIN WITH LEFT-SIDED SCIATICA: Primary | ICD-10-CM

## 2024-09-17 PROCEDURE — 3079F DIAST BP 80-89 MM HG: CPT | Performed by: FAMILY MEDICINE

## 2024-09-17 PROCEDURE — 3075F SYST BP GE 130 - 139MM HG: CPT | Performed by: FAMILY MEDICINE

## 2024-09-17 PROCEDURE — 99213 OFFICE O/P EST LOW 20 MIN: CPT | Performed by: FAMILY MEDICINE

## 2024-09-17 RX ORDER — IBUPROFEN 800 MG/1
800 TABLET, FILM COATED ORAL EVERY 8 HOURS PRN
Qty: 90 TABLET | Refills: 3 | Status: SHIPPED | OUTPATIENT
Start: 2024-09-17

## 2024-09-17 RX ORDER — IBUPROFEN 800 MG/1
800 TABLET, FILM COATED ORAL EVERY 6 HOURS PRN
Qty: 90 TABLET | Refills: 3 | Status: SHIPPED | OUTPATIENT
Start: 2024-09-17 | End: 2024-09-17

## 2025-08-02 DIAGNOSIS — G89.29 CHRONIC LEFT-SIDED LOW BACK PAIN WITH LEFT-SIDED SCIATICA: ICD-10-CM

## 2025-08-02 DIAGNOSIS — M54.42 CHRONIC LEFT-SIDED LOW BACK PAIN WITH LEFT-SIDED SCIATICA: ICD-10-CM

## 2025-08-02 DIAGNOSIS — M54.50 LUMBAR PAIN: ICD-10-CM

## 2025-08-04 RX ORDER — IBUPROFEN 800 MG/1
800 TABLET, FILM COATED ORAL EVERY 8 HOURS PRN
Qty: 90 TABLET | Refills: 3 | Status: SHIPPED | OUTPATIENT
Start: 2025-08-04

## (undated) DEVICE — BW-412T DISP COMBO CLEANING BRUSH: Brand: SINGLE USE COMBINATION CLEANING BRUSH

## (undated) DEVICE — SOLUTION IV IRRIG WATER 500ML POUR BRL ST 2F7113

## (undated) DEVICE — AIR/WATER CLEANING ADAPTER FOR OLYMPUS® GI ENDOSCOPE: Brand: BULLDOG®

## (undated) DEVICE — ENDOSCOPIC KIT 6X3/16 FT COLON W/ 1.1 OZ 2 GWN W/O BRSH

## (undated) DEVICE — SINGLE USE AIR/WATER, SUCTION AND BIOPSY VALVES SET: Brand: ORCAPOD™